# Patient Record
Sex: MALE | Race: WHITE | Employment: FULL TIME | URBAN - METROPOLITAN AREA
[De-identification: names, ages, dates, MRNs, and addresses within clinical notes are randomized per-mention and may not be internally consistent; named-entity substitution may affect disease eponyms.]

---

## 2017-02-15 PROBLEM — M77.12 LATERAL EPICONDYLITIS, LEFT ELBOW: Status: ACTIVE | Noted: 2017-02-15

## 2017-04-11 ENCOUNTER — TELEPHONE (OUTPATIENT)
Dept: FAMILY MEDICINE CLINIC | Facility: CLINIC | Age: 42
End: 2017-04-11

## 2017-04-11 NOTE — TELEPHONE ENCOUNTER
Received outside lab results. Called and informed pt (per dr gaviria) that results were good, cholesterol was elevated but to focus on a low lipid diet. Pt expressed understanding and had no further questions.

## 2017-08-11 ENCOUNTER — LABORATORY ENCOUNTER (OUTPATIENT)
Dept: LAB | Age: 42
End: 2017-08-11
Attending: FAMILY MEDICINE
Payer: COMMERCIAL

## 2017-08-11 ENCOUNTER — OFFICE VISIT (OUTPATIENT)
Dept: FAMILY MEDICINE CLINIC | Facility: CLINIC | Age: 42
End: 2017-08-11

## 2017-08-11 VITALS
WEIGHT: 208 LBS | RESPIRATION RATE: 20 BRPM | HEART RATE: 72 BPM | HEIGHT: 67.5 IN | DIASTOLIC BLOOD PRESSURE: 80 MMHG | OXYGEN SATURATION: 99 % | SYSTOLIC BLOOD PRESSURE: 120 MMHG | BODY MASS INDEX: 32.27 KG/M2 | TEMPERATURE: 99 F

## 2017-08-11 DIAGNOSIS — Z13.0 SCREENING FOR ENDOCRINE, NUTRITIONAL, METABOLIC AND IMMUNITY DISORDER: ICD-10-CM

## 2017-08-11 DIAGNOSIS — Z13.228 SCREENING FOR ENDOCRINE, NUTRITIONAL, METABOLIC AND IMMUNITY DISORDER: ICD-10-CM

## 2017-08-11 DIAGNOSIS — Z00.00 ROUTINE GENERAL MEDICAL EXAMINATION AT A HEALTH CARE FACILITY: Primary | ICD-10-CM

## 2017-08-11 DIAGNOSIS — Z13.29 SCREENING FOR ENDOCRINE, NUTRITIONAL, METABOLIC AND IMMUNITY DISORDER: ICD-10-CM

## 2017-08-11 DIAGNOSIS — Z13.21 SCREENING FOR ENDOCRINE, NUTRITIONAL, METABOLIC AND IMMUNITY DISORDER: ICD-10-CM

## 2017-08-11 LAB
25-HYDROXYVITAMIN D (TOTAL): 24.7 NG/ML (ref 30–100)
ALBUMIN SERPL-MCNC: 4 G/DL (ref 3.5–4.8)
ALP LIVER SERPL-CCNC: 68 U/L (ref 45–117)
ALT SERPL-CCNC: 38 U/L (ref 17–63)
AST SERPL-CCNC: 14 U/L (ref 15–41)
BASOPHILS # BLD AUTO: 0.05 X10(3) UL (ref 0–0.1)
BASOPHILS NFR BLD AUTO: 0.8 %
BILIRUB SERPL-MCNC: 0.6 MG/DL (ref 0.1–2)
BUN BLD-MCNC: 15 MG/DL (ref 8–20)
CALCIUM BLD-MCNC: 8.9 MG/DL (ref 8.3–10.3)
CHLORIDE: 107 MMOL/L (ref 101–111)
CHOLEST SMN-MCNC: 213 MG/DL (ref ?–200)
CO2: 28 MMOL/L (ref 22–32)
CREAT BLD-MCNC: 0.87 MG/DL (ref 0.7–1.3)
EOSINOPHIL # BLD AUTO: 0.07 X10(3) UL (ref 0–0.3)
EOSINOPHIL NFR BLD AUTO: 1.2 %
ERYTHROCYTE [DISTWIDTH] IN BLOOD BY AUTOMATED COUNT: 13.1 % (ref 11.5–16)
GLUCOSE BLD-MCNC: 97 MG/DL (ref 70–99)
HCT VFR BLD AUTO: 47.2 % (ref 37–53)
HDLC SERPL-MCNC: 52 MG/DL (ref 45–?)
HDLC SERPL: 4.1 {RATIO} (ref ?–4.97)
HGB BLD-MCNC: 15.7 G/DL (ref 13–17)
IMMATURE GRANULOCYTE COUNT: 0.07 X10(3) UL (ref 0–1)
IMMATURE GRANULOCYTE RATIO %: 1.2 %
LDLC SERPL CALC-MCNC: 126 MG/DL (ref ?–130)
LDLC SERPL-MCNC: 35 MG/DL (ref 5–40)
LYMPHOCYTES # BLD AUTO: 1.26 X10(3) UL (ref 0.9–4)
LYMPHOCYTES NFR BLD AUTO: 20.8 %
M PROTEIN MFR SERPL ELPH: 7.1 G/DL (ref 6.1–8.3)
MCH RBC QN AUTO: 30.3 PG (ref 27–33.2)
MCHC RBC AUTO-ENTMCNC: 33.3 G/DL (ref 31–37)
MCV RBC AUTO: 90.9 FL (ref 80–99)
MONOCYTES # BLD AUTO: 0.48 X10(3) UL (ref 0.1–0.6)
MONOCYTES NFR BLD AUTO: 7.9 %
NEUTROPHIL ABS PRELIM: 4.13 X10 (3) UL (ref 1.3–6.7)
NEUTROPHILS # BLD AUTO: 4.13 X10(3) UL (ref 1.3–6.7)
NEUTROPHILS NFR BLD AUTO: 68.1 %
NONHDLC SERPL-MCNC: 161 MG/DL (ref ?–130)
PLATELET # BLD AUTO: 252 10(3)UL (ref 150–450)
POTASSIUM SERPL-SCNC: 4 MMOL/L (ref 3.6–5.1)
RBC # BLD AUTO: 5.19 X10(6)UL (ref 4.3–5.7)
RED CELL DISTRIBUTION WIDTH-SD: 43.6 FL (ref 35.1–46.3)
SODIUM SERPL-SCNC: 142 MMOL/L (ref 136–144)
TRIGLYCERIDES: 177 MG/DL (ref ?–150)
TSI SER-ACNC: 2.8 MIU/ML (ref 0.35–5.5)
WBC # BLD AUTO: 6.1 X10(3) UL (ref 4–13)

## 2017-08-11 PROCEDURE — 36415 COLL VENOUS BLD VENIPUNCTURE: CPT | Performed by: FAMILY MEDICINE

## 2017-08-11 PROCEDURE — 99396 PREV VISIT EST AGE 40-64: CPT | Performed by: FAMILY MEDICINE

## 2017-08-11 PROCEDURE — 80061 LIPID PANEL: CPT | Performed by: FAMILY MEDICINE

## 2017-08-11 PROCEDURE — 82306 VITAMIN D 25 HYDROXY: CPT | Performed by: FAMILY MEDICINE

## 2017-08-11 PROCEDURE — 80050 GENERAL HEALTH PANEL: CPT | Performed by: FAMILY MEDICINE

## 2017-08-11 RX ORDER — PHENTERMINE HYDROCHLORIDE 37.5 MG/1
37.5 TABLET ORAL
Qty: 30 TABLET | Refills: 1 | Status: SHIPPED
Start: 2017-08-11 | End: 2017-10-10

## 2017-08-11 NOTE — PATIENT INSTRUCTIONS
4 Steps for Eating Healthier  Changing the way you eat can improve your health. It can lower your cholesterol and blood pressure, and help you stay at a healthy weight. Your diet doesn’t have to be bland and boring to be healthy.  Just watch your calories · Drink more water to match your fiber increase. This is to help prevent constipation. Date Last Reviewed: 5/11/2015  © 1373-5771 The [facility], [address] [place] [address]. All rights reserved.  This information is not intended a

## 2017-08-11 NOTE — PROGRESS NOTES
Thu Souza is a 40year old male who presents for a complete physical exam.   HPI:      Patient presents with:  Physical      Patient is present for complete physical. Feels very well. Up to date with dental visits. No hearing problems.  Vaccinations: Location: [facility]   Family History   Problem Relation Age of Onset   • Heart Disorder Father    • Cancer Other      family hx   • CAD [OTHER] Maternal Grandfather    • CAD [OTHER] Paternal Grandfather    • Diabetes Maternal murmurs. Lungs: are clear to auscultation bilaterally, with no wheeze, rhonchi, or rales. Heart: is RRR. S1, S2, with no murmurs. Abdomen: is soft, NT/ND with no HSM. No rebound or guarding, NABS.      exam: testes bilaterally descended without m

## 2017-08-15 ENCOUNTER — TELEPHONE (OUTPATIENT)
Dept: FAMILY MEDICINE CLINIC | Facility: CLINIC | Age: 42
End: 2017-08-15

## 2017-08-15 NOTE — TELEPHONE ENCOUNTER
Informed pt of labs and instructions listed below. Pt expressed understanding with no further questions.

## 2017-08-15 NOTE — TELEPHONE ENCOUNTER
----- Message from Rohit Jansen MD sent at 8/12/2017 11:33 AM CDT -----  Overall very good, cont. Vit.  D 1000U a day

## 2017-09-11 ENCOUNTER — OFFICE VISIT (OUTPATIENT)
Dept: FAMILY MEDICINE CLINIC | Facility: CLINIC | Age: 42
End: 2017-09-11

## 2017-09-11 VITALS
TEMPERATURE: 98 F | OXYGEN SATURATION: 99 % | HEART RATE: 70 BPM | WEIGHT: 205 LBS | SYSTOLIC BLOOD PRESSURE: 120 MMHG | HEIGHT: 69.5 IN | RESPIRATION RATE: 20 BRPM | DIASTOLIC BLOOD PRESSURE: 78 MMHG | BODY MASS INDEX: 29.68 KG/M2

## 2017-09-11 DIAGNOSIS — E66.9 OBESITY (BMI 30.0-34.9): ICD-10-CM

## 2017-09-11 DIAGNOSIS — Z51.81 ENCOUNTER FOR THERAPEUTIC DRUG MONITORING: Primary | ICD-10-CM

## 2017-09-11 PROCEDURE — 99213 OFFICE O/P EST LOW 20 MIN: CPT | Performed by: FAMILY MEDICINE

## 2017-09-11 RX ORDER — PHENTERMINE HYDROCHLORIDE 37.5 MG/1
37.5 TABLET ORAL
Qty: 30 TABLET | Refills: 1 | Status: SHIPPED
Start: 2017-09-11 | End: 2017-11-10

## 2017-09-11 NOTE — PROGRESS NOTES
CC: Patient presents with:  Weight Check         HPI:  Pt lost 3  lbs. Pt exercises 3 times a week, watches carefully healthy diet. No chest pains, SOB, no palpitations. Tolerates Phentermine very well.           Current Outpatient Prescriptions:  Ph of these issues and agrees to the plan. All side effects of Phentermine d/w the pt, heart problems possible  in the future, not known effect were explained. Exercise at least 3 times per week, 30min and low calories diet d/w the pt. 15 minutes counseling.   P

## 2017-09-28 ENCOUNTER — TELEPHONE (OUTPATIENT)
Dept: FAMILY MEDICINE CLINIC | Facility: CLINIC | Age: 42
End: 2017-09-28

## 2017-09-28 NOTE — TELEPHONE ENCOUNTER
I called and spoke to patient to find out if he knows when he had the first 2 doses of the series completed. He states he had this done through his employer and believes the first one was given sometime in March 2017 and the last one given shortly after.  H

## 2017-09-28 NOTE — TELEPHONE ENCOUNTER
pt calling to notify office (as directed) that he had 3rd hep b vaccine on 9-26-17, states that he will have flu vaccine administered at workplace

## 2017-11-13 ENCOUNTER — OFFICE VISIT (OUTPATIENT)
Dept: FAMILY MEDICINE CLINIC | Facility: CLINIC | Age: 42
End: 2017-11-13

## 2017-11-13 VITALS
SYSTOLIC BLOOD PRESSURE: 120 MMHG | DIASTOLIC BLOOD PRESSURE: 70 MMHG | BODY MASS INDEX: 32.02 KG/M2 | HEIGHT: 67 IN | OXYGEN SATURATION: 99 % | WEIGHT: 204 LBS | HEART RATE: 68 BPM | TEMPERATURE: 98 F | RESPIRATION RATE: 20 BRPM

## 2017-11-13 DIAGNOSIS — Z51.81 ENCOUNTER FOR THERAPEUTIC DRUG MONITORING: ICD-10-CM

## 2017-11-13 DIAGNOSIS — E66.9 OBESITY (BMI 30.0-34.9): Primary | ICD-10-CM

## 2017-11-13 PROCEDURE — 99214 OFFICE O/P EST MOD 30 MIN: CPT | Performed by: FAMILY MEDICINE

## 2017-11-13 NOTE — PROGRESS NOTES
CC: Patient presents with:  Weight Check       HPI:  Pt presents for obesity problem. Pt has it for few years, playing hockey makes it better. Risk factors: sedentary life style. Makes better: low calories diet and physical activities.   Makes wors defined types were placed in this encounter. ASSESSMENT AND PLAN:     Obesity - Discussed BMI target. Discussed calorie counting, diet, exercise, and one pound of weight loss per week by decreasing calorie intake by 500 calories per day. Handout given

## 2017-12-20 ENCOUNTER — TELEPHONE (OUTPATIENT)
Dept: FAMILY MEDICINE CLINIC | Facility: CLINIC | Age: 42
End: 2017-12-20

## 2017-12-20 NOTE — TELEPHONE ENCOUNTER
Patient called and needs the dates of all of the times his family and him have been seen in the clinic for the past 7 years. He needs it for [facility]. Kids are Fredo Garcia 6/10/2010, Americo 6/1/09, Bibianajoellen Albaman 6/1/09 and his wife Juarez 10/3/1971.   Please advise lenora

## 2017-12-21 NOTE — TELEPHONE ENCOUNTER
Pt completed SHAYLA, and clarified that he only needed list of dates seen. Explained to Pt that in the office, we only have access to records from 2012 to present and that all other records must be obtained from main hospital, Pt confirmed understanding.  Prin

## 2017-12-21 NOTE — TELEPHONE ENCOUNTER
Patient is coming to the office and filing out an SHAYLA for the request. The patient is actually needing office note from each visit for the last 7 years for each person in the family.     Told the patient this request needs to be sent to [facility]

## 2018-01-15 ENCOUNTER — OFFICE VISIT (OUTPATIENT)
Dept: FAMILY MEDICINE CLINIC | Facility: CLINIC | Age: 43
End: 2018-01-15

## 2018-01-15 VITALS
SYSTOLIC BLOOD PRESSURE: 112 MMHG | HEART RATE: 70 BPM | DIASTOLIC BLOOD PRESSURE: 70 MMHG | BODY MASS INDEX: 32.49 KG/M2 | HEIGHT: 67 IN | WEIGHT: 207 LBS | RESPIRATION RATE: 20 BRPM | TEMPERATURE: 99 F | OXYGEN SATURATION: 99 %

## 2018-01-15 DIAGNOSIS — E66.9 OBESITY (BMI 30.0-34.9): ICD-10-CM

## 2018-01-15 DIAGNOSIS — Z51.81 ENCOUNTER FOR THERAPEUTIC DRUG MONITORING: Primary | ICD-10-CM

## 2018-01-15 PROCEDURE — 99214 OFFICE O/P EST MOD 30 MIN: CPT | Performed by: FAMILY MEDICINE

## 2018-01-15 RX ORDER — PHENTERMINE HYDROCHLORIDE 37.5 MG/1
37.5 TABLET ORAL
COMMUNITY
End: 2018-06-15 | Stop reason: ALTCHOICE

## 2018-01-15 NOTE — PROGRESS NOTES
CC: Patient presents with:  Weight Check       HPI:  Pt presents for obesity problem. Pt has it for:last few years. Risk factors: sedentary life style. Makes better: low calories diet and physical activities.   Makes worse: sugar craving and no exe abdomen, no organomegaly. PSYCH: normal mood, affect. No orders of the defined types were placed in this encounter. ASSESSMENT AND PLAN:     Obesity - Discussed BMI target.  Discussed calorie counting, diet, exercise, and one pound of weight loss

## 2018-03-08 ENCOUNTER — MED REC SCAN ONLY (OUTPATIENT)
Dept: FAMILY MEDICINE CLINIC | Facility: CLINIC | Age: 43
End: 2018-03-08

## 2018-03-08 ENCOUNTER — TELEPHONE (OUTPATIENT)
Dept: FAMILY MEDICINE CLINIC | Facility: CLINIC | Age: 43
End: 2018-03-08

## 2018-03-08 DIAGNOSIS — E78.00 ELEVATED CHOLESTEROL: Primary | ICD-10-CM

## 2018-03-08 NOTE — TELEPHONE ENCOUNTER
Paper lab results received from Dr. Kayode Kirby. Pt needs to follow low lipid diet, repeat lipid & CMP in 2 months. Labs placed in EPIC. Pt notified of this. Pt states he has appt on Monday with Dr. Kayode Kirby & will talk to her then.   Lab sent to scan,

## 2018-03-12 ENCOUNTER — OFFICE VISIT (OUTPATIENT)
Dept: FAMILY MEDICINE CLINIC | Facility: CLINIC | Age: 43
End: 2018-03-12

## 2018-03-12 VITALS
RESPIRATION RATE: 20 BRPM | HEART RATE: 82 BPM | WEIGHT: 207 LBS | TEMPERATURE: 99 F | BODY MASS INDEX: 32.49 KG/M2 | OXYGEN SATURATION: 99 % | HEIGHT: 67 IN | SYSTOLIC BLOOD PRESSURE: 124 MMHG | DIASTOLIC BLOOD PRESSURE: 82 MMHG

## 2018-03-12 DIAGNOSIS — E66.9 OBESITY (BMI 30.0-34.9): ICD-10-CM

## 2018-03-12 DIAGNOSIS — E78.00 PURE HYPERCHOLESTEROLEMIA: Primary | ICD-10-CM

## 2018-03-12 PROCEDURE — 99214 OFFICE O/P EST MOD 30 MIN: CPT | Performed by: FAMILY MEDICINE

## 2018-03-12 NOTE — PATIENT INSTRUCTIONS
Facts About Dietary Fat     Olive oil is a good source of unsaturated fat. Eating less saturated and trans fat is one of the best things you can do for your heart. Start by finding out which fats are better to use.  Then always try to use as little \" Date Last Reviewed: 6/1/2017  © 9327-6101 The [employer] [address]. [facility], [address] [place]. All rights reserved. This information is not intended as a substitute for professional medical care.  Always follow your healthcare professional' · Cut the sugar in recipes by 1/3 to 1/2. Boost the flavor with extracts like almond, vanilla, or orange. Or add spices such as cinnamon or nutmeg. Step 4. Eat more fiber  · Eat fresh fruits and vegetables every day. · Boost your diet with whole grains.

## 2018-03-12 NOTE — PROGRESS NOTES
CC: CATIE       Ga Baer is a 44year old male who presents for  Hyperlipidemia, obesity. Currently asymptomatic, no chest pains, jaw pains, arm pains. No skin lesions. No SOB on exertion or rest.  Current treatment: no medications.   Pt does not sm HERNIORRAPHY;               Surgeon: Kandis Carrillo MD;  Location: [address] [place] [address]   Social History:   Smoking status: Never Smoker                                                              Smokeless tobacco: Never Used

## 2018-04-09 ENCOUNTER — OFFICE VISIT (OUTPATIENT)
Dept: FAMILY MEDICINE CLINIC | Facility: CLINIC | Age: 43
End: 2018-04-09

## 2018-04-09 VITALS
WEIGHT: 205 LBS | DIASTOLIC BLOOD PRESSURE: 68 MMHG | OXYGEN SATURATION: 99 % | HEART RATE: 72 BPM | TEMPERATURE: 99 F | BODY MASS INDEX: 32.18 KG/M2 | HEIGHT: 67 IN | SYSTOLIC BLOOD PRESSURE: 120 MMHG | RESPIRATION RATE: 20 BRPM

## 2018-04-09 DIAGNOSIS — E78.00 PURE HYPERCHOLESTEROLEMIA: Primary | ICD-10-CM

## 2018-04-09 PROCEDURE — 99214 OFFICE O/P EST MOD 30 MIN: CPT | Performed by: FAMILY MEDICINE

## 2018-04-09 NOTE — PROGRESS NOTES
CC: CATIE       Gracie Edwards is a 44year old male who presents for  hyperlipidemia. Currently asymptomatic, no chest pains, jaw pains, arm pains. No skin lesions. No SOB on exertion or rest.  Current treatment: no medications. Pt does not smoke. Surgeon: Chey Napier MD;  Location: [facility]   Social History:   Smoking status: Never Smoker                                                              Smokeless tobacco: Never Used                      Alcohol

## 2018-06-14 ENCOUNTER — APPOINTMENT (OUTPATIENT)
Dept: LAB | Age: 43
End: 2018-06-14
Attending: FAMILY MEDICINE
Payer: COMMERCIAL

## 2018-06-14 DIAGNOSIS — E78.00 ELEVATED CHOLESTEROL: ICD-10-CM

## 2018-06-14 DIAGNOSIS — E66.9 OBESITY (BMI 30.0-34.9): ICD-10-CM

## 2018-06-14 DIAGNOSIS — E78.00 PURE HYPERCHOLESTEROLEMIA: ICD-10-CM

## 2018-06-14 PROCEDURE — 80061 LIPID PANEL: CPT | Performed by: FAMILY MEDICINE

## 2018-06-14 PROCEDURE — 36415 COLL VENOUS BLD VENIPUNCTURE: CPT | Performed by: FAMILY MEDICINE

## 2018-06-14 PROCEDURE — 80053 COMPREHEN METABOLIC PANEL: CPT | Performed by: FAMILY MEDICINE

## 2018-06-14 PROCEDURE — 84100 ASSAY OF PHOSPHORUS: CPT | Performed by: FAMILY MEDICINE

## 2018-06-15 ENCOUNTER — OFFICE VISIT (OUTPATIENT)
Dept: FAMILY MEDICINE CLINIC | Facility: CLINIC | Age: 43
End: 2018-06-15

## 2018-06-15 VITALS
HEART RATE: 70 BPM | WEIGHT: 207 LBS | DIASTOLIC BLOOD PRESSURE: 84 MMHG | RESPIRATION RATE: 16 BRPM | HEIGHT: 67 IN | TEMPERATURE: 98 F | BODY MASS INDEX: 32.49 KG/M2 | OXYGEN SATURATION: 98 % | SYSTOLIC BLOOD PRESSURE: 120 MMHG

## 2018-06-15 DIAGNOSIS — E66.9 OBESITY (BMI 30.0-34.9): Primary | ICD-10-CM

## 2018-06-15 PROCEDURE — 99214 OFFICE O/P EST MOD 30 MIN: CPT | Performed by: FAMILY MEDICINE

## 2018-06-15 NOTE — PROGRESS NOTES
CC: Patient presents with:  Lab Results: f/u       HPI:  Pt presents for obesity problem. Pt has problems with eating a lot one time of the day. No regular meals. Pt has it for few years, playing hockey makes it better.   Risk factors: sedentary life s abdomen, no organomegaly. PSYCH: normal mood, affect. No orders of the defined types were placed in this encounter. ASSESSMENT AND PLAN:     Obesity - Discussed BMI target.  Discussed calorie counting, diet, exercise, and one pound of weight loss

## 2018-06-20 ENCOUNTER — TELEPHONE (OUTPATIENT)
Dept: FAMILY MEDICINE CLINIC | Facility: CLINIC | Age: 43
End: 2018-06-20

## 2018-06-20 DIAGNOSIS — E83.39 SERUM PHOSPHATE ELEVATED: Primary | ICD-10-CM

## 2018-06-20 NOTE — TELEPHONE ENCOUNTER
I spoke with Dr. Griselda Edmond and he suggested to do additional blood work for Phosphorus for the pt. Please let hi do it, fasting and in the morning.

## 2018-06-21 NOTE — TELEPHONE ENCOUNTER
Patient aware of additional fasting lab tests. Patient says he will come in to the lab tomorrow morning 6/21.

## 2018-06-22 ENCOUNTER — APPOINTMENT (OUTPATIENT)
Dept: LAB | Age: 43
End: 2018-06-22
Attending: FAMILY MEDICINE
Payer: COMMERCIAL

## 2018-06-22 DIAGNOSIS — E83.39 SERUM PHOSPHATE ELEVATED: ICD-10-CM

## 2018-06-22 PROCEDURE — 84100 ASSAY OF PHOSPHORUS: CPT | Performed by: FAMILY MEDICINE

## 2018-06-22 PROCEDURE — 82570 ASSAY OF URINE CREATININE: CPT | Performed by: FAMILY MEDICINE

## 2018-06-22 PROCEDURE — 82310 ASSAY OF CALCIUM: CPT | Performed by: FAMILY MEDICINE

## 2018-06-22 PROCEDURE — 84105 ASSAY OF URINE PHOSPHORUS: CPT | Performed by: FAMILY MEDICINE

## 2018-06-22 PROCEDURE — 82565 ASSAY OF CREATININE: CPT | Performed by: FAMILY MEDICINE

## 2018-06-22 PROCEDURE — 83970 ASSAY OF PARATHORMONE: CPT | Performed by: FAMILY MEDICINE

## 2018-06-22 PROCEDURE — 36415 COLL VENOUS BLD VENIPUNCTURE: CPT | Performed by: FAMILY MEDICINE

## 2018-06-25 ENCOUNTER — TELEPHONE (OUTPATIENT)
Dept: FAMILY MEDICINE CLINIC | Facility: CLINIC | Age: 43
End: 2018-06-25

## 2018-06-25 NOTE — TELEPHONE ENCOUNTER
Spoke with patient and informed him of Dr. Jonathan Wadsworth contact information for low phos. Will call to schedule.

## 2018-06-25 NOTE — TELEPHONE ENCOUNTER
----- Message from Serg Terrell MD sent at 6/25/2018  9:50 AM CDT -----  Pt has unusual low Phosphorus , pt will see Dr. Deng Merida for second opinion.

## 2018-07-31 ENCOUNTER — OFFICE VISIT (OUTPATIENT)
Dept: NEPHROLOGY | Facility: CLINIC | Age: 43
End: 2018-07-31
Payer: COMMERCIAL

## 2018-07-31 VITALS — BODY MASS INDEX: 33 KG/M2 | WEIGHT: 210 LBS | SYSTOLIC BLOOD PRESSURE: 122 MMHG | DIASTOLIC BLOOD PRESSURE: 74 MMHG

## 2018-07-31 DIAGNOSIS — E83.39 HYPOPHOSPHATEMIA: Primary | ICD-10-CM

## 2018-07-31 PROCEDURE — 99244 OFF/OP CNSLTJ NEW/EST MOD 40: CPT | Performed by: INTERNAL MEDICINE

## 2018-07-31 NOTE — PROGRESS NOTES
Nephrology Consult Note    REASON FOR CONSULT: Hypophosphatemia    ASSESSMENT/PLAN:        1) Hypophosphatemia- discovered incidentally recently on a wellness exam with a serum phosphorus of less than 2 mg/dL on 3 separate occasions is likely due to mild p N/V/D  Denies change in urinary habits or gross hematuria  Denies LE edema  Denies skin rashes/myalgias/arthralgias    PMH:  Past Medical History:   Diagnosis Date   • Allergic rhinitis    • Erectile dysfunction    • TMJ (dislocation of temporomandibular j rate and rhythm, S1, S2 normal, no murmur or rub  Lungs: Clear without wheezes, rales, rhonchi. Abdomen: Soft, non-tender. + bowel sounds, no palpable organomegaly  Extremities: Without clubbing, cyanosis or edema.   Neurologic:  normal affect, cranial n

## 2018-12-04 ENCOUNTER — NURSE ONLY (OUTPATIENT)
Dept: FAMILY MEDICINE CLINIC | Facility: CLINIC | Age: 43
End: 2018-12-04
Payer: COMMERCIAL

## 2018-12-04 VITALS
TEMPERATURE: 98 F | WEIGHT: 205 LBS | OXYGEN SATURATION: 98 % | DIASTOLIC BLOOD PRESSURE: 82 MMHG | HEIGHT: 67 IN | SYSTOLIC BLOOD PRESSURE: 118 MMHG | HEART RATE: 70 BPM | RESPIRATION RATE: 18 BRPM | BODY MASS INDEX: 32.18 KG/M2

## 2018-12-04 DIAGNOSIS — J06.9 VIRAL URI: Primary | ICD-10-CM

## 2018-12-04 PROCEDURE — 99213 OFFICE O/P EST LOW 20 MIN: CPT | Performed by: NURSE PRACTITIONER

## 2018-12-04 RX ORDER — FLUTICASONE PROPIONATE 50 MCG
1 SPRAY, SUSPENSION (ML) NASAL 2 TIMES DAILY
Qty: 1 BOTTLE | Refills: 1 | Status: SHIPPED | OUTPATIENT
Start: 2018-12-04 | End: 2019-01-25

## 2018-12-04 NOTE — PATIENT INSTRUCTIONS
· Common colds are caused by viruses which are not eradicated with antibiotics. · Cold remedies are to relieve symptoms and prevent complications rather than cure infection  · Rest and increased oral fluid intake is advised.   · Increase humidity of the ai

## 2018-12-04 NOTE — PROGRESS NOTES
HPI:   Salud Veras is a 44year old male who presents with ill symptoms for  5  days. Patient reports congestion, queasiness from post nasal drip, sneezing frequently, fatigued, itchy ears.  Has tried Nyquil and Dayquil with mild intermittent relief, w edema, clear PND noted. Uvuvla midline. No sinus tenderness with palpation.   NECK: supple,no adenopathy  LUNGS: clear to auscultation all lobes  CARDIO: RRR without murmur  GI: good BS's,no masses, HSM or tenderness    ASSESSMENT AND PLAN:    PLAN:Jayro soares

## 2019-01-25 DIAGNOSIS — J06.9 VIRAL URI: ICD-10-CM

## 2019-01-25 RX ORDER — FLUTICASONE PROPIONATE 50 MCG
1 SPRAY, SUSPENSION (ML) NASAL 2 TIMES DAILY
Qty: 1 BOTTLE | Refills: 1 | Status: SHIPPED | OUTPATIENT
Start: 2019-01-25 | End: 2019-07-26

## 2019-01-25 NOTE — TELEPHONE ENCOUNTER
Patient would like a refill of the following medication to be sent to [facility]:  Fluticasone Propionate 50 MCG/ACT Nasal Suspension 1 Bottle 1 12/4/2018 1/3/2019    Sig - Route: 1 spray by Each Nare route 2 (two) times daily. - Each Nare    Sent to [facility]

## 2019-07-26 ENCOUNTER — OFFICE VISIT (OUTPATIENT)
Dept: FAMILY MEDICINE CLINIC | Facility: CLINIC | Age: 44
End: 2019-07-26
Payer: COMMERCIAL

## 2019-07-26 ENCOUNTER — LAB ENCOUNTER (OUTPATIENT)
Dept: LAB | Age: 44
End: 2019-07-26
Attending: FAMILY MEDICINE
Payer: COMMERCIAL

## 2019-07-26 VITALS
HEART RATE: 72 BPM | WEIGHT: 199 LBS | HEIGHT: 67 IN | DIASTOLIC BLOOD PRESSURE: 82 MMHG | RESPIRATION RATE: 20 BRPM | OXYGEN SATURATION: 99 % | TEMPERATURE: 99 F | SYSTOLIC BLOOD PRESSURE: 118 MMHG | BODY MASS INDEX: 31.23 KG/M2

## 2019-07-26 DIAGNOSIS — Z13.29 SCREENING FOR ENDOCRINE, NUTRITIONAL, METABOLIC AND IMMUNITY DISORDER: ICD-10-CM

## 2019-07-26 DIAGNOSIS — Z13.21 SCREENING FOR ENDOCRINE, NUTRITIONAL, METABOLIC AND IMMUNITY DISORDER: ICD-10-CM

## 2019-07-26 DIAGNOSIS — Z13.0 SCREENING FOR ENDOCRINE, NUTRITIONAL, METABOLIC AND IMMUNITY DISORDER: ICD-10-CM

## 2019-07-26 DIAGNOSIS — Z00.00 ROUTINE GENERAL MEDICAL EXAMINATION AT A HEALTH CARE FACILITY: Primary | ICD-10-CM

## 2019-07-26 DIAGNOSIS — Z13.228 SCREENING FOR ENDOCRINE, NUTRITIONAL, METABOLIC AND IMMUNITY DISORDER: ICD-10-CM

## 2019-07-26 DIAGNOSIS — Z23 NEED FOR TDAP VACCINATION: ICD-10-CM

## 2019-07-26 LAB
ALBUMIN SERPL-MCNC: 4.2 G/DL (ref 3.4–5)
ALBUMIN/GLOB SERPL: 1.3 {RATIO} (ref 1–2)
ALP LIVER SERPL-CCNC: 76 U/L (ref 45–117)
ALT SERPL-CCNC: 34 U/L (ref 16–61)
ANION GAP SERPL CALC-SCNC: 6 MMOL/L (ref 0–18)
AST SERPL-CCNC: 15 U/L (ref 15–37)
BASOPHILS # BLD AUTO: 0.06 X10(3) UL (ref 0–0.2)
BASOPHILS NFR BLD AUTO: 0.8 %
BILIRUB SERPL-MCNC: 0.8 MG/DL (ref 0.1–2)
BUN BLD-MCNC: 10 MG/DL (ref 7–18)
BUN/CREAT SERPL: 10.5 (ref 10–20)
CALCIUM BLD-MCNC: 9.2 MG/DL (ref 8.5–10.1)
CHLORIDE SERPL-SCNC: 107 MMOL/L (ref 98–112)
CHOLEST SMN-MCNC: 224 MG/DL (ref ?–200)
CO2 SERPL-SCNC: 27 MMOL/L (ref 21–32)
CREAT BLD-MCNC: 0.95 MG/DL (ref 0.7–1.3)
DEPRECATED RDW RBC AUTO: 43.8 FL (ref 35.1–46.3)
EOSINOPHIL # BLD AUTO: 0.07 X10(3) UL (ref 0–0.7)
EOSINOPHIL NFR BLD AUTO: 1 %
ERYTHROCYTE [DISTWIDTH] IN BLOOD BY AUTOMATED COUNT: 13.1 % (ref 11–15)
GLOBULIN PLAS-MCNC: 3.3 G/DL (ref 2.8–4.4)
GLUCOSE BLD-MCNC: 87 MG/DL (ref 70–99)
HCT VFR BLD AUTO: 46.9 % (ref 39–53)
HDLC SERPL-MCNC: 56 MG/DL (ref 40–59)
HGB BLD-MCNC: 15.6 G/DL (ref 13–17.5)
IMM GRANULOCYTES # BLD AUTO: 0.07 X10(3) UL (ref 0–1)
IMM GRANULOCYTES NFR BLD: 1 %
LDLC SERPL CALC-MCNC: 146 MG/DL (ref ?–100)
LYMPHOCYTES # BLD AUTO: 1.93 X10(3) UL (ref 1–4)
LYMPHOCYTES NFR BLD AUTO: 27.1 %
M PROTEIN MFR SERPL ELPH: 7.5 G/DL (ref 6.4–8.2)
MCH RBC QN AUTO: 30.4 PG (ref 26–34)
MCHC RBC AUTO-ENTMCNC: 33.3 G/DL (ref 31–37)
MCV RBC AUTO: 91.2 FL (ref 80–100)
MONOCYTES # BLD AUTO: 0.7 X10(3) UL (ref 0.1–1)
MONOCYTES NFR BLD AUTO: 9.8 %
NEUTROPHILS # BLD AUTO: 4.28 X10 (3) UL (ref 1.5–7.7)
NEUTROPHILS # BLD AUTO: 4.28 X10(3) UL (ref 1.5–7.7)
NEUTROPHILS NFR BLD AUTO: 60.3 %
NONHDLC SERPL-MCNC: 168 MG/DL (ref ?–130)
OSMOLALITY SERPL CALC.SUM OF ELEC: 288 MOSM/KG (ref 275–295)
PLATELET # BLD AUTO: 263 10(3)UL (ref 150–450)
POTASSIUM SERPL-SCNC: 3.9 MMOL/L (ref 3.5–5.1)
RBC # BLD AUTO: 5.14 X10(6)UL (ref 4.3–5.7)
SODIUM SERPL-SCNC: 140 MMOL/L (ref 136–145)
TRIGL SERPL-MCNC: 111 MG/DL (ref 30–149)
TSI SER-ACNC: 3.19 MIU/ML (ref 0.36–3.74)
VIT D+METAB SERPL-MCNC: 23.7 NG/ML (ref 30–100)
VLDLC SERPL CALC-MCNC: 22 MG/DL (ref 0–30)
WBC # BLD AUTO: 7.1 X10(3) UL (ref 4–11)

## 2019-07-26 PROCEDURE — 90471 IMMUNIZATION ADMIN: CPT | Performed by: FAMILY MEDICINE

## 2019-07-26 PROCEDURE — 80061 LIPID PANEL: CPT | Performed by: FAMILY MEDICINE

## 2019-07-26 PROCEDURE — 90715 TDAP VACCINE 7 YRS/> IM: CPT | Performed by: FAMILY MEDICINE

## 2019-07-26 PROCEDURE — 82306 VITAMIN D 25 HYDROXY: CPT | Performed by: FAMILY MEDICINE

## 2019-07-26 PROCEDURE — 99396 PREV VISIT EST AGE 40-64: CPT | Performed by: FAMILY MEDICINE

## 2019-07-26 PROCEDURE — 80050 GENERAL HEALTH PANEL: CPT | Performed by: FAMILY MEDICINE

## 2019-07-26 PROCEDURE — 36415 COLL VENOUS BLD VENIPUNCTURE: CPT | Performed by: FAMILY MEDICINE

## 2019-07-26 RX ORDER — FLUTICASONE PROPIONATE 50 MCG
1 SPRAY, SUSPENSION (ML) NASAL 2 TIMES DAILY
Qty: 1 BOTTLE | Refills: 4 | Status: SHIPPED | OUTPATIENT
Start: 2019-07-26 | End: 2019-10-08

## 2019-07-26 NOTE — PATIENT INSTRUCTIONS
Dr. Nyasia Key     [facility]ermat[facility]    [facility] [id number]  [facility]    Phone: [phone]

## 2019-07-26 NOTE — PROGRESS NOTES
Salud Veras is a 38year old male who presents for a complete physical exam.   HPI:      Patient presents with:  Physical      Patient is present for complete physical. Feels very well. Up to date with dental visits. No hearing problems.  Vaccinations: [address]      Family History   Problem Relation Age of Onset   • Heart Disorder Father    • Cancer Other         family hx   • Other (CAD) Maternal Grandfather    • Other (CAD) Paternal Grandfather    • Diabetes Maternal Grandmother       Socia clear to auscultation bilaterally, with no wheeze, rhonchi, or rales. Heart: is RRR. S1, S2, with no murmurs. Abdomen: is soft, NT/ND with no HSM. No rebound or guarding, NABS.  exam: testes bilaterally descended without masses.   No urethral D/C TSH          Standing Status: Future          Standing Expiration Date: 7/25/2020      VITAMIN D, 25-HYDROXY          Standing Status: Future          Standing Expiration Date: 7/25/2020      TdaP (Adacel, Boostrix) (69768) (DX V06.1/Z23)  Vaccinations marissa

## 2019-10-08 ENCOUNTER — TELEPHONE (OUTPATIENT)
Dept: FAMILY MEDICINE CLINIC | Facility: CLINIC | Age: 44
End: 2019-10-08

## 2019-10-08 ENCOUNTER — OFFICE VISIT (OUTPATIENT)
Dept: FAMILY MEDICINE CLINIC | Facility: CLINIC | Age: 44
End: 2019-10-08
Payer: COMMERCIAL

## 2019-10-08 ENCOUNTER — LAB ENCOUNTER (OUTPATIENT)
Dept: LAB | Age: 44
End: 2019-10-08
Attending: NURSE PRACTITIONER
Payer: COMMERCIAL

## 2019-10-08 VITALS
SYSTOLIC BLOOD PRESSURE: 120 MMHG | DIASTOLIC BLOOD PRESSURE: 84 MMHG | WEIGHT: 202 LBS | RESPIRATION RATE: 16 BRPM | HEIGHT: 67 IN | HEART RATE: 64 BPM | OXYGEN SATURATION: 98 % | BODY MASS INDEX: 31.71 KG/M2

## 2019-10-08 DIAGNOSIS — R07.89 OTHER CHEST PAIN: ICD-10-CM

## 2019-10-08 DIAGNOSIS — H65.02 NON-RECURRENT ACUTE SEROUS OTITIS MEDIA OF LEFT EAR: ICD-10-CM

## 2019-10-08 DIAGNOSIS — M25.512 ACUTE PAIN OF LEFT SHOULDER: Primary | ICD-10-CM

## 2019-10-08 PROCEDURE — 36415 COLL VENOUS BLD VENIPUNCTURE: CPT

## 2019-10-08 PROCEDURE — 85025 COMPLETE CBC W/AUTO DIFF WBC: CPT

## 2019-10-08 PROCEDURE — 80053 COMPREHEN METABOLIC PANEL: CPT

## 2019-10-08 PROCEDURE — 85379 FIBRIN DEGRADATION QUANT: CPT

## 2019-10-08 PROCEDURE — 84484 ASSAY OF TROPONIN QUANT: CPT

## 2019-10-08 PROCEDURE — 93000 ELECTROCARDIOGRAM COMPLETE: CPT | Performed by: NURSE PRACTITIONER

## 2019-10-08 PROCEDURE — 99214 OFFICE O/P EST MOD 30 MIN: CPT | Performed by: NURSE PRACTITIONER

## 2019-10-08 RX ORDER — METHYLPREDNISOLONE 4 MG/1
TABLET ORAL
Qty: 1 KIT | Refills: 0 | Status: SHIPPED | OUTPATIENT
Start: 2019-10-08 | End: 2019-11-08 | Stop reason: ALTCHOICE

## 2019-10-08 RX ORDER — CYCLOBENZAPRINE HCL 10 MG
5 TABLET ORAL NIGHTLY
Qty: 30 TABLET | Refills: 0 | Status: SHIPPED | OUTPATIENT
Start: 2019-10-08 | End: 2019-11-08 | Stop reason: ALTCHOICE

## 2019-10-08 RX ORDER — DICLOFENAC SODIUM 75 MG/1
75 TABLET, DELAYED RELEASE ORAL 2 TIMES DAILY
Qty: 60 TABLET | Refills: 0 | Status: SHIPPED | OUTPATIENT
Start: 2019-10-08 | End: 2019-11-07

## 2019-10-08 RX ORDER — DOXYCYCLINE HYCLATE 100 MG/1
100 CAPSULE ORAL 2 TIMES DAILY
Qty: 14 CAPSULE | Refills: 0 | Status: SHIPPED | OUTPATIENT
Start: 2019-10-08 | End: 2019-10-15

## 2019-10-08 NOTE — TELEPHONE ENCOUNTER
Patient called he started having chest pain last night and was checked out by a paramedic since he is a . Patient said he woke up with the chest pain, ear pain to where it is radiating down his left side to his arm.  I called triage spoke with Danna blils

## 2019-10-08 NOTE — TELEPHONE ENCOUNTER
----- Message from [employer] FNCOLLINS-C sent at 10/8/2019 12:09 PM CDT -----  Labs are good

## 2019-10-08 NOTE — PROGRESS NOTES
Patient presents with:  Shoulder Pain: c/o left shoulder pain, trouble sleeping       HPI: L shoulder has been hurting for 2 days . Dull, throbbing  in character. Radiation to the upper L arm and Left chest pain. Rates pain 3 /10. No weakness.   No numbness WD/WN NAD  HEENT:  PEERL, EOM-i. Left ear: erythematous, tender on palpation over mastoid , no drainage. Throat: no erythema  or exudates. LUNGS:CTA clarisse.   HEART:S1/S2 reg., no murmurs, clicks, gallops  Chest: tender on palpation Left sided chest   SKIN:n

## 2019-10-08 NOTE — TELEPHONE ENCOUNTER
I called pt at [phone] and verified 2 patient identifiers: name & . I discussed results and recommendations at length with patient. Pt aware CBC is still pending. Pt has the ST scheduled for Tues. All questions answered.

## 2019-11-06 ENCOUNTER — HOSPITAL ENCOUNTER (OUTPATIENT)
Dept: CV DIAGNOSTICS | Age: 44
Discharge: HOME | End: 2019-11-06
Attending: NURSE PRACTITIONER
Payer: COMMERCIAL

## 2019-11-06 DIAGNOSIS — R07.89 OTHER CHEST PAIN: ICD-10-CM

## 2019-11-06 PROCEDURE — 93018 CV STRESS TEST I&R ONLY: CPT | Performed by: NURSE PRACTITIONER

## 2019-11-06 PROCEDURE — 93350 STRESS TTE ONLY: CPT | Performed by: NURSE PRACTITIONER

## 2019-11-06 PROCEDURE — 93017 CV STRESS TEST TRACING ONLY: CPT | Performed by: NURSE PRACTITIONER

## 2019-11-07 ENCOUNTER — TELEPHONE (OUTPATIENT)
Dept: FAMILY MEDICINE CLINIC | Facility: CLINIC | Age: 44
End: 2019-11-07

## 2019-11-07 NOTE — TELEPHONE ENCOUNTER
Please see below message. I don't see any calls or that Stress Echo results are back yet in Epic. Please advise on results or if you contacted pt. Thank you.

## 2019-11-08 ENCOUNTER — OFFICE VISIT (OUTPATIENT)
Dept: FAMILY MEDICINE CLINIC | Facility: CLINIC | Age: 44
End: 2019-11-08
Payer: COMMERCIAL

## 2019-11-08 VITALS
DIASTOLIC BLOOD PRESSURE: 70 MMHG | WEIGHT: 200 LBS | TEMPERATURE: 98 F | HEIGHT: 67 IN | OXYGEN SATURATION: 99 % | HEART RATE: 68 BPM | RESPIRATION RATE: 20 BRPM | SYSTOLIC BLOOD PRESSURE: 128 MMHG | BODY MASS INDEX: 31.39 KG/M2

## 2019-11-08 DIAGNOSIS — J02.9 SORE THROAT: Primary | ICD-10-CM

## 2019-11-08 DIAGNOSIS — J00 ACUTE NASOPHARYNGITIS: ICD-10-CM

## 2019-11-08 PROCEDURE — 87880 STREP A ASSAY W/OPTIC: CPT | Performed by: NURSE PRACTITIONER

## 2019-11-08 PROCEDURE — 99213 OFFICE O/P EST LOW 20 MIN: CPT | Performed by: NURSE PRACTITIONER

## 2019-11-08 NOTE — PROGRESS NOTES
CHIEF COMPLAINT:   Patient presents with:  Nasal Congestion: Headache, post nasal drip, night sweats, dry cough mainly to clear throat, unable to sleep through the night, X 4  Note: Needs a work note for tonight shift      HPI:   Mikal Boles is a 38 y /70   Pulse 68   Temp 97.8 °F (36.6 °C) (Oral)   Resp 20   Ht 67\"   Wt 200 lb (90.7 kg)   SpO2 99%   BMI 31.32 kg/m²   GENERAL: well developed, well nourished,in no apparent distress  SKIN: no rashes,no suspicious lesions  HEAD: atraumatic, normocep You have a viral upper respiratory illness (URI), which is another term for the common cold. This illness is contagious during the first few days. It is spread through the air by coughing and sneezing.  It may also be spread by direct contact (touching the · Over-the-counter cold medicines will not shorten the length of time you’re sick, but they may be helpful for the following symptoms: cough, sore throat, and nasal and sinus congestion.  If you take prescription medicines, ask your healthcare provider or p

## 2019-12-06 ENCOUNTER — OFFICE VISIT (OUTPATIENT)
Dept: FAMILY MEDICINE CLINIC | Facility: CLINIC | Age: 44
End: 2019-12-06
Payer: COMMERCIAL

## 2019-12-06 VITALS
HEIGHT: 67 IN | SYSTOLIC BLOOD PRESSURE: 140 MMHG | BODY MASS INDEX: 31.39 KG/M2 | OXYGEN SATURATION: 97 % | HEART RATE: 84 BPM | TEMPERATURE: 98 F | DIASTOLIC BLOOD PRESSURE: 89 MMHG | WEIGHT: 200 LBS

## 2019-12-06 DIAGNOSIS — M54.50 ACUTE LOW BACK PAIN WITHOUT SCIATICA, UNSPECIFIED BACK PAIN LATERALITY: Primary | ICD-10-CM

## 2019-12-06 PROCEDURE — 99213 OFFICE O/P EST LOW 20 MIN: CPT | Performed by: NURSE PRACTITIONER

## 2019-12-06 RX ORDER — METHYLPREDNISOLONE 4 MG/1
TABLET ORAL
Qty: 1 PACKAGE | Refills: 0 | Status: SHIPPED | OUTPATIENT
Start: 2019-12-06 | End: 2020-02-28 | Stop reason: ALTCHOICE

## 2019-12-06 RX ORDER — CYCLOBENZAPRINE HCL 5 MG
5 TABLET ORAL 3 TIMES DAILY PRN
Qty: 30 TABLET | Refills: 0 | Status: SHIPPED | OUTPATIENT
Start: 2019-12-06 | End: 2020-02-28 | Stop reason: ALTCHOICE

## 2019-12-06 NOTE — PROGRESS NOTES
CHIEF COMPLAINT:     Patient presents with:  Back Pain: back pain (1 day)       HPI:   Carloz Anderson is a 38year old male who is here for complaints of lower pain. Pain is located at low back. Pain is described as aching, cramping, throbbing.  Severity /89 (BP Location: Right arm)   Pulse 84   Temp 98 °F (36.7 °C) (Oral)   Ht 67\"   Wt 200 lb (90.7 kg)   SpO2 97%   BMI 31.32 kg/m²    GENERAL: well developed, well nourished,in no apparent distress  SKIN: no rashes,no suspicious lesions  NECK: supple · It can be localized to one spot or area, or it can be more generalized. · It can spread or radiate upwards, to the front, or go down your arms or legs (sciatica). · It can cause muscle spasm.   Most of the time, mechanical problems with the muscles or s · At first, do not try to stretch out the sore spots. If there is a strain, it is not like the good soreness you get after exercising without an injury. In this case, stretching may make it worse.   · Don't sit for long periods, as in a long car ride or dur · Be careful if you are given a prescription medicines, narcotics, or medicine for muscle spasms. They can cause drowsiness, affect your coordination, reflexes, and judgement. Do not drive or operate heavy machinery.   Follow-up care  Follow up with your he

## 2020-02-28 ENCOUNTER — OFFICE VISIT (OUTPATIENT)
Dept: FAMILY MEDICINE CLINIC | Facility: CLINIC | Age: 45
End: 2020-02-28
Payer: COMMERCIAL

## 2020-02-28 VITALS
HEART RATE: 76 BPM | WEIGHT: 200 LBS | DIASTOLIC BLOOD PRESSURE: 84 MMHG | OXYGEN SATURATION: 98 % | HEIGHT: 67 IN | RESPIRATION RATE: 16 BRPM | BODY MASS INDEX: 31.39 KG/M2 | TEMPERATURE: 98 F | SYSTOLIC BLOOD PRESSURE: 122 MMHG

## 2020-02-28 DIAGNOSIS — R51.9 ACUTE NONINTRACTABLE HEADACHE, UNSPECIFIED HEADACHE TYPE: Primary | ICD-10-CM

## 2020-02-28 PROCEDURE — 99213 OFFICE O/P EST LOW 20 MIN: CPT | Performed by: PHYSICIAN ASSISTANT

## 2020-02-28 NOTE — PROGRESS NOTES
CHIEF COMPLAINT:     Patient presents with:  Headache: x today, feels like a migrain or like hes hungover, sensitive to light         HPI:     Barrington Solis is a 41year old male presents with complaints of headache that began today when he woke up.   Rambo Watkins palpitations  LUNGS: Denies shortness of breath, cough, or wheezing  GI: Denies abdominal pain, N/V/C/D.   MUSCULOSKELETAL: no arthralgia or swollen joints  LYMPH:  Denies lymphadenopathy  NEURO:  Headaches as described above. Denies lightheadedness.   No

## 2020-02-28 NOTE — PATIENT INSTRUCTIONS
Headache, Unspecified    A number of things can cause headaches. The cause of your headache isn’t clear. But it doesn’t seem to be a sign of any serious illness. Headache affects almost everyone at some time.  It is the most common reason people miss days · If you have a migraine headache, use sunglasses when in the daylight or around bright indoor lighting until your symptoms get better. Bright glaring light can make this type of headache worse.   Follow-up care  Follow up with your healthcare provider, or

## 2020-08-26 ENCOUNTER — TELEPHONE (OUTPATIENT)
Dept: FAMILY MEDICINE CLINIC | Facility: CLINIC | Age: 45
End: 2020-08-26

## 2020-08-26 DIAGNOSIS — B07.0 PLANTAR WART, LEFT FOOT: Primary | ICD-10-CM

## 2020-08-26 NOTE — TELEPHONE ENCOUNTER
Reason for the order/referral:DERMATOLOGY/CHECK UP   PCP: BRIAN   Refer to Provider [facility]   Specialty:DERMATOLOGY   Patient Insurance: Payor: [employer] / Plan: [id number] / Product Type: HMO /   Duration/Summary of Symptoms:ONGOING   Is this

## 2021-05-15 ENCOUNTER — OFFICE VISIT (OUTPATIENT)
Dept: FAMILY MEDICINE CLINIC | Facility: CLINIC | Age: 46
End: 2021-05-15

## 2021-05-15 DIAGNOSIS — M62.830 MUSCLE SPASM OF BACK: ICD-10-CM

## 2021-05-15 DIAGNOSIS — M54.50 ACUTE BILATERAL LOW BACK PAIN WITHOUT SCIATICA: Primary | ICD-10-CM

## 2021-05-15 PROCEDURE — 99213 OFFICE O/P EST LOW 20 MIN: CPT | Performed by: NURSE PRACTITIONER

## 2021-05-15 RX ORDER — NAPROXEN 500 MG/1
500 TABLET ORAL 2 TIMES DAILY WITH MEALS
Qty: 30 TABLET | Refills: 0 | Status: SHIPPED | OUTPATIENT
Start: 2021-05-15 | End: 2021-05-30

## 2021-05-15 RX ORDER — CYCLOBENZAPRINE HCL 10 MG
10 TABLET ORAL 3 TIMES DAILY
Qty: 30 TABLET | Refills: 0 | Status: SHIPPED | OUTPATIENT
Start: 2021-05-15 | End: 2021-06-04

## 2021-05-15 NOTE — PROGRESS NOTES
CHIEF COMPLAINT:     Patient presents with:  Back Pain      HPI:   Brittany Latif is a 40year old male who is here for complaints of back pain. Pain is located at neck to lower bilateral back region. Pain is described as aching, cramping, sharp.  Patricia heartburn  : no dysuria, urgency or flank pain. MUSCULOSKELETAL: Per HPI. No other joints are affected  NEURO: No numbness or tingling. No loss of bowel or bladder control.     EXAM:   O2 sats- 98%, HR-91, BP- 131/93, resp-20  GENERAL: well developed,

## 2022-09-14 ENCOUNTER — TELEPHONE (OUTPATIENT)
Dept: FAMILY MEDICINE CLINIC | Facility: CLINIC | Age: 47
End: 2022-09-14

## 2022-09-14 NOTE — TELEPHONE ENCOUNTER
Patient brought in FMLA forms to take care of his wife Juarez 10/3/1971.  [id number]    Took $25 payment made copies of forms and placed original in Triage

## 2023-02-06 ENCOUNTER — LAB ENCOUNTER (OUTPATIENT)
Dept: LAB | Age: 48
End: 2023-02-06
Attending: FAMILY MEDICINE
Payer: COMMERCIAL

## 2023-02-06 ENCOUNTER — OFFICE VISIT (OUTPATIENT)
Dept: FAMILY MEDICINE CLINIC | Facility: CLINIC | Age: 48
End: 2023-02-06
Payer: COMMERCIAL

## 2023-02-06 VITALS
BODY MASS INDEX: 32.8 KG/M2 | HEART RATE: 67 BPM | DIASTOLIC BLOOD PRESSURE: 86 MMHG | SYSTOLIC BLOOD PRESSURE: 122 MMHG | RESPIRATION RATE: 18 BRPM | HEIGHT: 67 IN | OXYGEN SATURATION: 100 % | WEIGHT: 209 LBS

## 2023-02-06 DIAGNOSIS — Z00.00 ROUTINE GENERAL MEDICAL EXAMINATION AT A HEALTH CARE FACILITY: Primary | ICD-10-CM

## 2023-02-06 DIAGNOSIS — Z13.29 SCREENING FOR ENDOCRINE, NUTRITIONAL, METABOLIC AND IMMUNITY DISORDER: ICD-10-CM

## 2023-02-06 DIAGNOSIS — Z13.228 SCREENING FOR ENDOCRINE, NUTRITIONAL, METABOLIC AND IMMUNITY DISORDER: ICD-10-CM

## 2023-02-06 DIAGNOSIS — Z13.0 SCREENING FOR ENDOCRINE, NUTRITIONAL, METABOLIC AND IMMUNITY DISORDER: ICD-10-CM

## 2023-02-06 DIAGNOSIS — S29.011A MUSCLE STRAIN OF CHEST WALL, INITIAL ENCOUNTER: ICD-10-CM

## 2023-02-06 DIAGNOSIS — Z13.21 SCREENING FOR ENDOCRINE, NUTRITIONAL, METABOLIC AND IMMUNITY DISORDER: ICD-10-CM

## 2023-02-06 DIAGNOSIS — Z12.5 ENCOUNTER FOR PROSTATE CANCER SCREENING: ICD-10-CM

## 2023-02-06 DIAGNOSIS — Z12.11 SCREENING FOR COLON CANCER: ICD-10-CM

## 2023-02-06 LAB
ALBUMIN SERPL-MCNC: 3.9 G/DL (ref 3.4–5)
ALBUMIN/GLOB SERPL: 1.3 {RATIO} (ref 1–2)
ALP LIVER SERPL-CCNC: 63 U/L
ALT SERPL-CCNC: 34 U/L
ANION GAP SERPL CALC-SCNC: 0 MMOL/L (ref 0–18)
AST SERPL-CCNC: 13 U/L (ref 15–37)
BASOPHILS # BLD AUTO: 0.09 X10(3) UL (ref 0–0.2)
BASOPHILS NFR BLD AUTO: 1.5 %
BILIRUB SERPL-MCNC: 0.4 MG/DL (ref 0.1–2)
BUN BLD-MCNC: 13 MG/DL (ref 7–18)
CALCIUM BLD-MCNC: 9 MG/DL (ref 8.5–10.1)
CHLORIDE SERPL-SCNC: 107 MMOL/L (ref 98–112)
CHOLEST SERPL-MCNC: 209 MG/DL (ref ?–200)
CO2 SERPL-SCNC: 29 MMOL/L (ref 21–32)
COMPLEXED PSA SERPL-MCNC: 1.04 NG/ML (ref ?–4)
CREAT BLD-MCNC: 0.87 MG/DL
EOSINOPHIL # BLD AUTO: 0.11 X10(3) UL (ref 0–0.7)
EOSINOPHIL NFR BLD AUTO: 1.9 %
ERYTHROCYTE [DISTWIDTH] IN BLOOD BY AUTOMATED COUNT: 13.2 %
FASTING PATIENT LIPID ANSWER: YES
FASTING STATUS PATIENT QL REPORTED: YES
GFR SERPLBLD BASED ON 1.73 SQ M-ARVRAT: 107 ML/MIN/1.73M2 (ref 60–?)
GLOBULIN PLAS-MCNC: 3 G/DL (ref 2.8–4.4)
GLUCOSE BLD-MCNC: 96 MG/DL (ref 70–99)
HCT VFR BLD AUTO: 49.2 %
HDLC SERPL-MCNC: 55 MG/DL (ref 40–59)
HGB BLD-MCNC: 16.2 G/DL
IMM GRANULOCYTES # BLD AUTO: 0.06 X10(3) UL (ref 0–1)
IMM GRANULOCYTES NFR BLD: 1 %
LDLC SERPL CALC-MCNC: 133 MG/DL (ref ?–100)
LYMPHOCYTES # BLD AUTO: 1.37 X10(3) UL (ref 1–4)
LYMPHOCYTES NFR BLD AUTO: 23.1 %
MCH RBC QN AUTO: 31 PG (ref 26–34)
MCHC RBC AUTO-ENTMCNC: 32.9 G/DL (ref 31–37)
MCV RBC AUTO: 94.1 FL
MONOCYTES # BLD AUTO: 0.5 X10(3) UL (ref 0.1–1)
MONOCYTES NFR BLD AUTO: 8.4 %
NEUTROPHILS # BLD AUTO: 3.81 X10 (3) UL (ref 1.5–7.7)
NEUTROPHILS # BLD AUTO: 3.81 X10(3) UL (ref 1.5–7.7)
NEUTROPHILS NFR BLD AUTO: 64.1 %
NONHDLC SERPL-MCNC: 154 MG/DL (ref ?–130)
OSMOLALITY SERPL CALC.SUM OF ELEC: 282 MOSM/KG (ref 275–295)
PLATELET # BLD AUTO: 256 10(3)UL (ref 150–450)
POTASSIUM SERPL-SCNC: 4 MMOL/L (ref 3.5–5.1)
PROT SERPL-MCNC: 6.9 G/DL (ref 6.4–8.2)
RBC # BLD AUTO: 5.23 X10(6)UL
SODIUM SERPL-SCNC: 136 MMOL/L (ref 136–145)
TRIGL SERPL-MCNC: 119 MG/DL (ref 30–149)
TSI SER-ACNC: 2.82 MIU/ML (ref 0.36–3.74)
VIT D+METAB SERPL-MCNC: 10.9 NG/ML (ref 30–100)
VLDLC SERPL CALC-MCNC: 22 MG/DL (ref 0–30)
WBC # BLD AUTO: 5.9 X10(3) UL (ref 4–11)

## 2023-02-06 PROCEDURE — 85025 COMPLETE CBC W/AUTO DIFF WBC: CPT

## 2023-02-06 PROCEDURE — 80053 COMPREHEN METABOLIC PANEL: CPT

## 2023-02-06 PROCEDURE — 36415 COLL VENOUS BLD VENIPUNCTURE: CPT

## 2023-02-06 PROCEDURE — 84443 ASSAY THYROID STIM HORMONE: CPT

## 2023-02-06 PROCEDURE — 82306 VITAMIN D 25 HYDROXY: CPT

## 2023-02-06 PROCEDURE — 80061 LIPID PANEL: CPT

## 2023-02-06 RX ORDER — METHYLPREDNISOLONE 4 MG/1
TABLET ORAL
Qty: 1 EACH | Refills: 0 | Status: SHIPPED | OUTPATIENT
Start: 2023-02-06

## 2023-02-06 RX ORDER — CYCLOBENZAPRINE HCL 5 MG
5 TABLET ORAL 3 TIMES DAILY PRN
Qty: 30 TABLET | Refills: 3 | Status: SHIPPED | OUTPATIENT
Start: 2023-02-06

## 2023-02-07 ENCOUNTER — TELEPHONE (OUTPATIENT)
Dept: FAMILY MEDICINE CLINIC | Facility: CLINIC | Age: 48
End: 2023-02-07

## 2023-02-07 ENCOUNTER — APPOINTMENT (OUTPATIENT)
Dept: LAB | Age: 48
End: 2023-02-07
Attending: FAMILY MEDICINE
Payer: COMMERCIAL

## 2023-02-07 DIAGNOSIS — E55.9 VITAMIN D DEFICIENCY: Primary | ICD-10-CM

## 2023-02-07 RX ORDER — ERGOCALCIFEROL 1.25 MG/1
50000 CAPSULE ORAL WEEKLY
Qty: 4 CAPSULE | Refills: 5 | Status: SHIPPED | OUTPATIENT
Start: 2023-02-07 | End: 2023-03-09

## 2023-02-07 NOTE — TELEPHONE ENCOUNTER
----- Message from Cate Oglesby MD sent at 2/7/2023 11:22 AM CST -----  Overall very stable, please send 4 tablets of vit. D with 5 refills, extremely low vit. D level, ok to recheck the level in like 4 months.

## 2023-02-11 ENCOUNTER — PATIENT MESSAGE (OUTPATIENT)
Dept: FAMILY MEDICINE CLINIC | Facility: CLINIC | Age: 48
End: 2023-02-11

## 2023-02-11 DIAGNOSIS — Z00.00 ENCOUNTER FOR BLOOD TEST FOR ROUTINE GENERAL PHYSICAL EXAMINATION: Primary | ICD-10-CM

## 2023-02-13 NOTE — TELEPHONE ENCOUNTER
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 Please advise. Thank you.    LOV: 02/06/23

## 2023-02-13 NOTE — TELEPHONE ENCOUNTER
From: Grace Rosenthal  Sent: 2/11/2023 9:27 AM CST  To: [facility] Clinical Staff  Subject: results    I was looking through my results and could not find my blood type. At work, - , I need to place a Tin  patch inside my vest of my blood type in case of a medical emergency. Can you please relay this to me.    Car Cunha

## 2023-07-19 ENCOUNTER — TELEPHONE (OUTPATIENT)
Dept: FAMILY MEDICINE CLINIC | Facility: CLINIC | Age: 48
End: 2023-07-19

## 2023-07-19 DIAGNOSIS — R10.9 ABDOMINAL PAIN DUE TO INJURY: Primary | ICD-10-CM

## 2023-07-19 NOTE — TELEPHONE ENCOUNTER
CT order placed and pt notified. Provided number for Central Scheduling. Pt to keep OV with NP on 7/22.

## 2023-07-19 NOTE — TELEPHONE ENCOUNTER
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 Tuesday morning he woke up with pain on lower abdomin pt has a history of hernias with repair, I scheduled him an appt to see Gerard Dubois on 07/22/23. Pt stated Dr. Kris Paul is aware oh his history and is wondering if she might want to see him sooner.

## 2023-07-19 NOTE — TELEPHONE ENCOUNTER
Pt requesting imaging orders r/t lower abd. Pain post fall 7/17. OV set for 7/22 w/ Meg Jaramillo. Hx of hernias. Please advise. Will provide ER precautions. LOV 2/6/23.

## 2023-07-20 ENCOUNTER — HOSPITAL ENCOUNTER (OUTPATIENT)
Dept: CT IMAGING | Facility: HOSPITAL | Age: 48
Discharge: HOME | End: 2023-07-20
Attending: FAMILY MEDICINE
Payer: COMMERCIAL

## 2023-07-20 DIAGNOSIS — R10.9 ABDOMINAL PAIN DUE TO INJURY: ICD-10-CM

## 2023-07-20 LAB
CREAT BLD-MCNC: 0.9 MG/DL
EGFRCR SERPLBLD CKD-EPI 2021: 106 ML/MIN/1.73M2 (ref 60–?)

## 2023-07-20 PROCEDURE — 82565 ASSAY OF CREATININE: CPT

## 2023-07-20 PROCEDURE — 74174 CTA ABD&PLVS W/CONTRAST: CPT | Performed by: FAMILY MEDICINE

## 2023-07-22 ENCOUNTER — OFFICE VISIT (OUTPATIENT)
Dept: FAMILY MEDICINE CLINIC | Facility: CLINIC | Age: 48
End: 2023-07-22
Payer: COMMERCIAL

## 2023-07-22 VITALS
HEIGHT: 67 IN | RESPIRATION RATE: 16 BRPM | WEIGHT: 210 LBS | SYSTOLIC BLOOD PRESSURE: 110 MMHG | OXYGEN SATURATION: 98 % | BODY MASS INDEX: 32.96 KG/M2 | DIASTOLIC BLOOD PRESSURE: 80 MMHG | HEART RATE: 68 BPM

## 2023-07-22 DIAGNOSIS — S39.011A STRAIN OF ABDOMINAL MUSCLE, INITIAL ENCOUNTER: Primary | ICD-10-CM

## 2023-07-22 PROCEDURE — 3079F DIAST BP 80-89 MM HG: CPT | Performed by: NURSE PRACTITIONER

## 2023-07-22 PROCEDURE — 3008F BODY MASS INDEX DOCD: CPT | Performed by: NURSE PRACTITIONER

## 2023-07-22 PROCEDURE — 3074F SYST BP LT 130 MM HG: CPT | Performed by: NURSE PRACTITIONER

## 2023-07-22 PROCEDURE — 99214 OFFICE O/P EST MOD 30 MIN: CPT | Performed by: NURSE PRACTITIONER

## 2023-07-22 RX ORDER — METHYLPREDNISOLONE 4 MG/1
TABLET ORAL
Qty: 21 EACH | Refills: 0 | Status: SHIPPED | OUTPATIENT
Start: 2023-07-22

## 2023-07-22 RX ORDER — CYCLOBENZAPRINE HCL 10 MG
5 TABLET ORAL NIGHTLY
Qty: 30 TABLET | Refills: 0 | Status: SHIPPED | OUTPATIENT
Start: 2023-07-22

## 2023-12-09 ENCOUNTER — OFFICE VISIT (OUTPATIENT)
Dept: FAMILY MEDICINE CLINIC | Facility: CLINIC | Age: 48
End: 2023-12-09
Payer: COMMERCIAL

## 2023-12-09 VITALS
HEIGHT: 67 IN | SYSTOLIC BLOOD PRESSURE: 120 MMHG | RESPIRATION RATE: 16 BRPM | OXYGEN SATURATION: 99 % | HEART RATE: 69 BPM | WEIGHT: 205 LBS | DIASTOLIC BLOOD PRESSURE: 80 MMHG | BODY MASS INDEX: 32.18 KG/M2

## 2023-12-09 DIAGNOSIS — Z12.11 SCREENING FOR COLON CANCER: ICD-10-CM

## 2023-12-09 DIAGNOSIS — Z00.00 LABORATORY EXAMINATION ORDERED AS PART OF A ROUTINE GENERAL MEDICAL EXAMINATION: ICD-10-CM

## 2023-12-09 DIAGNOSIS — M54.2 CERVICALGIA: Primary | ICD-10-CM

## 2023-12-09 DIAGNOSIS — E55.9 VITAMIN D DEFICIENCY: ICD-10-CM

## 2023-12-09 DIAGNOSIS — Z12.5 ENCOUNTER FOR PROSTATE CANCER SCREENING: ICD-10-CM

## 2023-12-09 RX ORDER — METHYLPREDNISOLONE 4 MG/1
TABLET ORAL
Qty: 1 EACH | Refills: 0 | Status: SHIPPED | OUTPATIENT
Start: 2023-12-09

## 2024-01-04 ENCOUNTER — APPOINTMENT (OUTPATIENT)
Dept: LAB | Age: 49
End: 2024-01-04
Attending: FAMILY MEDICINE
Payer: COMMERCIAL

## 2024-01-10 ENCOUNTER — TELEPHONE (OUTPATIENT)
Dept: FAMILY MEDICINE CLINIC | Facility: CLINIC | Age: 49
End: 2024-01-10

## 2024-01-10 NOTE — TELEPHONE ENCOUNTER
Notified the patient of the below FIT results. Patient verbalized understanding. Answered all questions at this time.

## 2024-01-18 ENCOUNTER — LAB ENCOUNTER (OUTPATIENT)
Dept: LAB | Age: 49
End: 2024-01-18
Attending: FAMILY MEDICINE
Payer: COMMERCIAL

## 2024-01-18 DIAGNOSIS — E55.9 VITAMIN D DEFICIENCY: ICD-10-CM

## 2024-01-18 DIAGNOSIS — Z12.5 ENCOUNTER FOR PROSTATE CANCER SCREENING: ICD-10-CM

## 2024-01-18 DIAGNOSIS — Z00.00 LABORATORY EXAMINATION ORDERED AS PART OF A ROUTINE GENERAL MEDICAL EXAMINATION: ICD-10-CM

## 2024-01-18 LAB
ALBUMIN SERPL-MCNC: 3.6 G/DL (ref 3.4–5)
ALBUMIN/GLOB SERPL: 1.2 {RATIO} (ref 1–2)
ALP LIVER SERPL-CCNC: 57 U/L
ALT SERPL-CCNC: 33 U/L
ANION GAP SERPL CALC-SCNC: 2 MMOL/L (ref 0–18)
AST SERPL-CCNC: 16 U/L (ref 15–37)
BASOPHILS # BLD AUTO: 0.06 X10(3) UL (ref 0–0.2)
BASOPHILS NFR BLD AUTO: 1 %
BILIRUB SERPL-MCNC: 0.4 MG/DL (ref 0.1–2)
BUN BLD-MCNC: 17 MG/DL (ref 9–23)
CALCIUM BLD-MCNC: 8.7 MG/DL (ref 8.5–10.1)
CHLORIDE SERPL-SCNC: 112 MMOL/L (ref 98–112)
CHOLEST SERPL-MCNC: 213 MG/DL (ref ?–200)
CO2 SERPL-SCNC: 27 MMOL/L (ref 21–32)
COMPLEXED PSA SERPL-MCNC: 0.71 NG/ML (ref ?–4)
CREAT BLD-MCNC: 0.82 MG/DL
EGFRCR SERPLBLD CKD-EPI 2021: 109 ML/MIN/1.73M2 (ref 60–?)
EOSINOPHIL # BLD AUTO: 0.07 X10(3) UL (ref 0–0.7)
EOSINOPHIL NFR BLD AUTO: 1.1 %
ERYTHROCYTE [DISTWIDTH] IN BLOOD BY AUTOMATED COUNT: 13.1 %
FASTING PATIENT LIPID ANSWER: YES
FASTING STATUS PATIENT QL REPORTED: YES
GLOBULIN PLAS-MCNC: 3.1 G/DL (ref 2.8–4.4)
GLUCOSE BLD-MCNC: 111 MG/DL (ref 70–99)
HCT VFR BLD AUTO: 45.7 %
HDLC SERPL-MCNC: 52 MG/DL (ref 40–59)
HGB BLD-MCNC: 15.2 G/DL
IMM GRANULOCYTES # BLD AUTO: 0.08 X10(3) UL (ref 0–1)
IMM GRANULOCYTES NFR BLD: 1.3 %
LDLC SERPL CALC-MCNC: 144 MG/DL (ref ?–100)
LYMPHOCYTES # BLD AUTO: 1.48 X10(3) UL (ref 1–4)
LYMPHOCYTES NFR BLD AUTO: 24.3 %
MCH RBC QN AUTO: 30.2 PG (ref 26–34)
MCHC RBC AUTO-ENTMCNC: 33.3 G/DL (ref 31–37)
MCV RBC AUTO: 90.9 FL
MONOCYTES # BLD AUTO: 0.42 X10(3) UL (ref 0.1–1)
MONOCYTES NFR BLD AUTO: 6.9 %
NEUTROPHILS # BLD AUTO: 3.98 X10 (3) UL (ref 1.5–7.7)
NEUTROPHILS # BLD AUTO: 3.98 X10(3) UL (ref 1.5–7.7)
NEUTROPHILS NFR BLD AUTO: 65.4 %
NONHDLC SERPL-MCNC: 161 MG/DL (ref ?–130)
OSMOLALITY SERPL CALC.SUM OF ELEC: 294 MOSM/KG (ref 275–295)
PLATELET # BLD AUTO: 261 10(3)UL (ref 150–450)
POTASSIUM SERPL-SCNC: 4.3 MMOL/L (ref 3.5–5.1)
PROT SERPL-MCNC: 6.7 G/DL (ref 6.4–8.2)
RBC # BLD AUTO: 5.03 X10(6)UL
SODIUM SERPL-SCNC: 141 MMOL/L (ref 136–145)
TRIGL SERPL-MCNC: 96 MG/DL (ref 30–149)
TSI SER-ACNC: 1.81 MIU/ML (ref 0.36–3.74)
VIT D+METAB SERPL-MCNC: 19.4 NG/ML (ref 30–100)
VLDLC SERPL CALC-MCNC: 18 MG/DL (ref 0–30)
WBC # BLD AUTO: 6.1 X10(3) UL (ref 4–11)

## 2024-01-18 PROCEDURE — 80053 COMPREHEN METABOLIC PANEL: CPT

## 2024-01-18 PROCEDURE — 82306 VITAMIN D 25 HYDROXY: CPT

## 2024-01-18 PROCEDURE — 36415 COLL VENOUS BLD VENIPUNCTURE: CPT

## 2024-01-18 PROCEDURE — 84443 ASSAY THYROID STIM HORMONE: CPT

## 2024-01-18 PROCEDURE — 85025 COMPLETE CBC W/AUTO DIFF WBC: CPT

## 2024-01-18 PROCEDURE — 80061 LIPID PANEL: CPT

## 2024-01-22 RX ORDER — ERGOCALCIFEROL 1.25 MG/1
50000 CAPSULE ORAL WEEKLY
Qty: 4 CAPSULE | Refills: 4 | Status: SHIPPED | OUTPATIENT
Start: 2024-01-22

## 2024-02-01 ENCOUNTER — OFFICE VISIT (OUTPATIENT)
Dept: FAMILY MEDICINE CLINIC | Facility: CLINIC | Age: 49
End: 2024-02-01
Payer: COMMERCIAL

## 2024-02-01 VITALS
WEIGHT: 206 LBS | BODY MASS INDEX: 32.33 KG/M2 | SYSTOLIC BLOOD PRESSURE: 118 MMHG | HEART RATE: 70 BPM | DIASTOLIC BLOOD PRESSURE: 82 MMHG | HEIGHT: 67 IN | RESPIRATION RATE: 21 BRPM | OXYGEN SATURATION: 98 %

## 2024-02-01 DIAGNOSIS — E78.00 PURE HYPERCHOLESTEROLEMIA: ICD-10-CM

## 2024-02-01 DIAGNOSIS — Z00.00 ROUTINE GENERAL MEDICAL EXAMINATION AT A HEALTH CARE FACILITY: Primary | ICD-10-CM

## 2024-02-01 DIAGNOSIS — D23.5 DERMOID CYST OF SKIN OF BACK: ICD-10-CM

## 2024-02-01 PROCEDURE — 99213 OFFICE O/P EST LOW 20 MIN: CPT | Performed by: FAMILY MEDICINE

## 2024-02-01 PROCEDURE — 3008F BODY MASS INDEX DOCD: CPT | Performed by: FAMILY MEDICINE

## 2024-02-01 PROCEDURE — 3074F SYST BP LT 130 MM HG: CPT | Performed by: FAMILY MEDICINE

## 2024-02-01 PROCEDURE — 99396 PREV VISIT EST AGE 40-64: CPT | Performed by: FAMILY MEDICINE

## 2024-02-01 PROCEDURE — 3079F DIAST BP 80-89 MM HG: CPT | Performed by: FAMILY MEDICINE

## 2024-02-01 NOTE — PROGRESS NOTES
Jayro Franks is a 49 year old male who presents for a complete physical exam.   HPI:        Chief Complaint   Patient presents with    Well Adult     Annual Physical        Patient is present for complete physical. Feels very well. Up to date with dental visits.No hearing problems. Vaccinations: up to date.  Pt has in lower back, cyst, got it before.Growing bigger now.  Pt present for hyperlipidemia. Currently asymptomatic, no chest pains, jaw pains, arm pains. No skin lesions.  No SOB on exertion or rest.  Current treatment: no medications.  Pt does not smoke. Pts father used to smoke, has one stent in coronary artery.    Wt Readings from Last 3 Encounters:   02/01/24 206 lb (93.4 kg)   12/09/23 205 lb (93 kg)   07/22/23 210 lb (95.3 kg)      BP Readings from Last 3 Encounters:   02/01/24 118/82   12/09/23 120/80   07/22/23 110/80       Cholesterol, Total (mg/dL)   Date Value   01/18/2024 213 (H)   02/06/2023 209 (H)   07/26/2019 224 (H)     CHOLESTEROL, TOTAL (mg/dL)   Date Value   01/06/2012 217 (H)     CHOLESTEROL (mg/dL)   Date Value   08/08/2014 245 (H)     HDL Cholesterol (mg/dL)   Date Value   01/18/2024 52   02/06/2023 55   07/26/2019 56     HDL CHOLESTEROL (mg/dL)   Date Value   01/06/2012 56     HDL CHOL (mg/dL)   Date Value   08/08/2014 56     LDL Cholesterol (mg/dL)   Date Value   01/18/2024 144 (H)   02/06/2023 133 (H)   07/26/2019 146 (H)     LDL-CHOLESTEROL (mg/dL (calc))   Date Value   01/06/2012 130 (H)     LDL CHOLESTROL (mg/dL)   Date Value   08/08/2014 164 (H)     AST (U/L)   Date Value   01/18/2024 16   02/06/2023 13 (L)   10/08/2019 12 (L)   08/08/2014 14 (L)   01/06/2012 16     ALT (U/L)   Date Value   01/18/2024 33   02/06/2023 34   10/08/2019 28   08/08/2014 32   01/06/2012 26      Current Outpatient Medications   Medication Sig Dispense Refill    ergocalciferol 1.25 MG (52299 UT) Oral Cap Take 1 capsule (50,000 Units total) by mouth once a week. 4 capsule 4    cyclobenzaprine 10  MG Oral Tab Take 0.5 tablets (5 mg total) by mouth nightly. (Patient taking differently: Take 0.5 tablets (5 mg total) by mouth nightly as needed.) 30 tablet 0    Loratadine (CLARITIN REDITABS OR) Take by mouth daily.        Past Medical History:   Diagnosis Date    Allergic rhinitis     Erectile dysfunction     TMJ (dislocation of temporomandibular joint)     Vitamin D deficiency       Past Surgical History:   Procedure Laterality Date    HERNIA SURGERY  1/1/99    inguinal.    LAPAROSCOPIC REPAIR OF INITIAL Right 3/25/2016    Procedure: LAPAROSCOPIC INGUINAL HERNIORRAPHY;  Surgeon: Adan Garcia MD;  Location: [facility], [facility]      Family History   Problem Relation Age of Onset    Heart Disorder Father     Cancer Other         family hx    Other (CAD) Maternal Grandfather     Other (CAD) Paternal Grandfather     Diabetes Maternal Grandmother       Social History     Socioeconomic History    Marital status:    Tobacco Use    Smoking status: Never    Smokeless tobacco: Never   Vaping Use    Vaping Use: Never used   Substance and Sexual Activity    Alcohol use: Yes     Alcohol/week: 1.7 standard drinks of alcohol     Types: 2 Standard drinks or equivalent per week     Comment: very rare     Drug use: Never   Other Topics Concern    Caffeine Concern Yes    Exercise No    Seat Belt Yes          REVIEW OF SYSTEMS:   GENERAL HEALTH: feels well, no fatigue.  SKIN: denies any unusual skin lesions or rashes  EYES: no visual complaints or deficits  HEENT: denies nasal congestion, sinus pain or sore throat; hearing loss negative.  RESPIRATORY: denies shortness of breath, wheezing or cough   CARDIOVASCULAR: denies chest pain, SOB on exertion,no orthopnea, no edema, no palpitations   GI: denies nausea, vomiting, constipation, diarrhea; no rectal bleeding; no heartburn  GENITAL/: no dysuria, urgency or frequency; no epididymal or testicular pain; no penile discharge; no hernias; no erectile dysfunction; no  nocturia  MUSCULOSKELETAL: no joint complaints upper or lower extremities  NEURO: no sensory or motor complaint  PSYCH: no symptoms of depression or anxiety, no insomnia.  HEMATOLOGY: denies hx anemia; denies bruising or excessive bleeding  ENDOCRINE: denies excessive thirst or urination; denies unexpected wt gain or wt loss  ALLERGY/IMM.: denies food or seasonal allergies  PSYCH: no anxiety, depression, mood issues.    EXAM:   /82   Pulse 70   Resp 21   Ht 5' 7\" (1.702 m)   Wt 206 lb (93.4 kg)   SpO2 98%   BMI 32.26 kg/m²     General: WD/WN in no acute distress.   HEENT: PERRLA and EOMI.  OP moist no lesions. TM normal, canals normal.  NECK: is supple,thyroid- normal. No carotid bruits.    Heart: is RRR.  S1, S2, with no murmurs.    Lungs: are clear to auscultation bilaterally, with no wheeze, rhonchi, or rales.  Heart: is RRR.  S1, S2, with no murmurs.    Abdomen: is soft, NT/ND with no HSM.  No rebound or guarding, NABS.    Extremities: are symmetric with no cyanosis, clubbing, or edema.    Skin: is unremarkable without rashes. L flank: 2cm dermoit cyst present.   Neuro: no focal abnormalities, and reflexes coordination and gait normal and symmetric.   MUSK/SKEL: Normal muscles tones, no joint abnormalities, full ROM of all extremities.    back- normal curves, no scoliosis, normal gait,  No joint or mulses abnormalities.  Psych: pt is alert, oriented x 3, normal affect.      ASSESSMENT AND PLAN:     Jayro Franks is a 49 year old male who presents for a complete physical exam.   Pt's weight is Body mass index is 32.26 kg/m²., recommended low fat diet and aerobic exercise 30 minutes three times weekly.       Pure hypercholesterolemia  Problem  well controlled. Pt recommended to consume a low fat diet and to exercise aerobically at least three times per week.   The patient indicates understanding of these issues and agrees to the plan.  The patient is asked to return for lipids check in 4  months.    Dermoid cyst of skin of back  -     SURGERY - INTERNAL          The patient indicates understanding of these issues and agrees to the plan.  The patient is asked to return for CPX in 1 year or sooner if needed.

## 2024-02-29 ENCOUNTER — OFFICE VISIT (OUTPATIENT)
Facility: LOCATION | Age: 49
End: 2024-02-29
Payer: COMMERCIAL

## 2024-02-29 VITALS — TEMPERATURE: 97 F | HEART RATE: 67 BPM

## 2024-02-29 DIAGNOSIS — L72.3 SEBACEOUS CYST: Primary | ICD-10-CM

## 2024-02-29 PROCEDURE — 99203 OFFICE O/P NEW LOW 30 MIN: CPT | Performed by: SURGERY

## 2024-02-29 NOTE — H&P
New Patient Visit Note       Active Problems      1. Sebaceous cyst        Chief Complaint   Chief Complaint   Patient presents with    New Patient     NP - Dermoid cyst of skin of back, small/medium size, rub against stuff some irritation, soft texture, discomfort, no other symptoms.       History of Present Illness     The patient presents to request of his primary care physician for evaluation of a dermoid cyst of the upper back.  Patient works as a  and he states that the area is irritated by his vest and his work equipment.  The patient wishes excision.  Patient has had inflammation in the area intermittently over time.    The patient denies fever, chills, chest pain, shortness of breath, dyspnea. The patient also denies hematemesis, melena, or hematochezia. The patient denies change in bowel or bladder habits. There is no complaint of hematuria or dysuria.    I discussed the risk, benefits, alternatives of surgery.  I discussed the anticipated postoperative recovery including dietary, activity, exercise, and lifestyle recommendations.  The patient's questions regarding surgical procedure were answered and the patient voiced understanding of the care plan.    Allergies  Jayro is allergic to amoxicillin.    Past Medical / Surgical / Social / Family History    The past medical and past surgical history have been reviewed by me today.    Past Medical History:   Diagnosis Date    Allergic rhinitis     Erectile dysfunction     TMJ (dislocation of temporomandibular joint)     Vitamin D deficiency      Past Surgical History:   Procedure Laterality Date    HERNIA SURGERY  1/1/99    inguinal.    LAPAROSCOPIC REPAIR OF INITIAL Right 3/25/2016    Procedure: LAPAROSCOPIC INGUINAL HERNIORRAPHY;  Surgeon: Adan Garcia MD;  Location: [facility]       The family history and social history have been reviewed by me today.    Family History   Problem Relation Age of Onset    Heart Disorder Father      Cancer Other         family hx    Other (CAD) Maternal Grandfather     Other (CAD) Paternal Grandfather     Diabetes Maternal Grandmother      Social History     Socioeconomic History    Marital status:    Tobacco Use    Smoking status: Never    Smokeless tobacco: Never   Vaping Use    Vaping Use: Never used   Substance and Sexual Activity    Alcohol use: Yes     Alcohol/week: 1.7 standard drinks of alcohol     Types: 2 Standard drinks or equivalent per week     Comment: very rare     Drug use: Never   Other Topics Concern    Caffeine Concern Yes    Exercise No    Seat Belt Yes        Current Outpatient Medications:     ergocalciferol 1.25 MG (78692 UT) Oral Cap, Take 1 capsule (50,000 Units total) by mouth once a week., Disp: 4 capsule, Rfl: 4    cyclobenzaprine 10 MG Oral Tab, Take 0.5 tablets (5 mg total) by mouth nightly. (Patient taking differently: Take 0.5 tablets (5 mg total) by mouth nightly as needed.), Disp: 30 tablet, Rfl: 0    Loratadine (CLARITIN REDITABS OR), Take by mouth daily., Disp: , Rfl:       Review of Systems  The Review of Systems has been reviewed by me during today.  Review of Systems   Constitutional: Negative.    HENT: Negative.     Eyes: Negative.    Respiratory: Negative.     Cardiovascular: Negative.    Gastrointestinal: Negative.    Genitourinary: Negative.    Musculoskeletal: Negative.    Skin: Negative.    Neurological: Negative.    Psychiatric/Behavioral: Negative.         Physical Findings   Pulse 67   Temp 97.2 °F (36.2 °C) (Temporal)   Physical Exam  Vitals and nursing note reviewed.   Constitutional:       General: He is not in acute distress.     Appearance: He is well-developed.   HENT:      Head: Normocephalic and atraumatic.   Eyes:      General: No scleral icterus.     Pupils: Pupils are equal, round, and reactive to light.   Neck:      Vascular: No JVD.      Trachea: No tracheal deviation.   Cardiovascular:      Rate and Rhythm: Normal rate and regular rhythm.    Pulmonary:      Effort: Pulmonary effort is normal. No respiratory distress.      Breath sounds: No stridor.       Abdominal:      General: Bowel sounds are normal. There is no distension.      Palpations: Abdomen is soft. Abdomen is not rigid. There is no mass.      Tenderness: There is no abdominal tenderness. There is no guarding or rebound. Negative signs include Medina's sign and McBurney's sign.      Hernia: No hernia is present.   Musculoskeletal:         General: Normal range of motion.      Cervical back: Normal range of motion and neck supple.   Skin:     General: Skin is warm and dry.   Neurological:      Mental Status: He is alert and oriented to person, place, and time.   Psychiatric:         Behavior: Behavior normal.             Assessment   1. Sebaceous cyst          Plan     The patient will be scheduled for in -office excision of epidermoid cyst with layered closure.    The kelsey-operative care plan was discussed with the patient, who voices understanding.  Activity and lifting recommendations were discussed in length.     The risks, benefits, and alternatives to the procedure were explained to the patient.  The risks explained include, but are not limited to, bleeding, infection, pain wound complications, recurrence, incorrect diagnosis, injury to adjacent organs and structures. We also discussed the possibile need for further therapeutic, diagnostic, or surgical intervention.  The patient voiced understanding, and after all questions were answered to the patient's satisfaction, the patient provided willing and informed consent to proceed.     No orders of the defined types were placed in this encounter.      Imaging & Referrals   None    Follow Up  No follow-ups on file.    Davon Hall MD

## 2024-04-03 ENCOUNTER — OFFICE VISIT (OUTPATIENT)
Facility: LOCATION | Age: 49
End: 2024-04-03
Payer: COMMERCIAL

## 2024-04-03 ENCOUNTER — TELEPHONE (OUTPATIENT)
Facility: LOCATION | Age: 49
End: 2024-04-03

## 2024-04-03 VITALS — TEMPERATURE: 98 F | HEART RATE: 70 BPM

## 2024-04-03 DIAGNOSIS — L72.3 SEBACEOUS CYST: Primary | ICD-10-CM

## 2024-04-03 PROCEDURE — 88304 TISSUE EXAM BY PATHOLOGIST: CPT | Performed by: SURGERY

## 2024-04-03 PROCEDURE — 12032 INTMD RPR S/A/T/EXT 2.6-7.5: CPT | Performed by: SURGERY

## 2024-04-03 PROCEDURE — 11403 EXC TR-EXT B9+MARG 2.1-3CM: CPT | Performed by: SURGERY

## 2024-04-03 NOTE — PROCEDURES
[facility]    Jayro Franks Patient Status:  No patient class for patient encounter    1975 MRN [id number]   Location [facility] Attending No att. providers found   Hosp Day # 0 PCP Lesly Holguin MD     Jayro Franks is a 49 year old male patient.  1. Sebaceous cyst      Past Medical History:   Diagnosis Date    Allergic rhinitis     Erectile dysfunction     TMJ (dislocation of temporomandibular joint)     Vitamin D deficiency      Pulse 70, temperature 97.6 °F (36.4 °C), temperature source Temporal.    Skin excision    Date/Time: 4/3/2024 4:44 PM    Performed by: Davon Hall MD  Authorized by: Davon Hlal MD    Number of Lesions: 1  Lesion 1:     Body area: trunk    Trunk location: back    Initial size (mm): 20    Malignancy: benign lesion      Repair type: linear closure    Closure complexity: intermediate      Surgical defect: 2 cm    Repair size (cm): 3.5    Repair comments: The patient's right upper back is prepped and draped in usual sterile fashion    Lidocaine 1% with epinephrine injected around the area of the lesion.    Full-thickness incision created and skin using scalpel.    Blunt sharp dissection using scalpel, scissors, and forceps to circumferentially dissect the sebaceous cyst from surrounding tissues.    Lesion removed from the operative field and submitted to pathology.    Hemostasis achieved with digital pressure.    Skin closure achieved using 3-0 Vicryl for the deep layer and subcuticular 4-0 Monocryl to reapproximate skin.    Steri-Strips applied.    Wound care instructions discussed.    Patient tolerated the procedure without apparent complication.    Wound check follow-up in 1 week.      Davon Hall MD  4/3/2024

## 2024-04-03 NOTE — PROGRESS NOTES
Follow Up Visit Note       Active Problems      No diagnosis found.      Chief Complaint   Chief Complaint   Patient presents with    [facility]     EP - Sebaceous cyst 2/29/24, Excision of skin lesion procedure in office,          History of Present Illness        Allergies  Jayro is allergic to amoxicillin.    Past Medical / Surgical / Social / Family History    The past medical and past surgical history have been reviewed by me today.    Past Medical History:   Diagnosis Date    Allergic rhinitis     Erectile dysfunction     TMJ (dislocation of temporomandibular joint)     Vitamin D deficiency      Past Surgical History:   Procedure Laterality Date    HERNIA SURGERY  1/1/99    inguinal.    LAPAROSCOPIC REPAIR OF INITIAL Right 3/25/2016    Procedure: LAPAROSCOPIC INGUINAL HERNIORRAPHY;  Surgeon: Adan Garcia MD;  Location: [facility], [facility]       The family history and social history have been reviewed by me today.    Family History   Problem Relation Age of Onset    Heart Disorder Father     Cancer Other         family hx    Other (CAD) Maternal Grandfather     Other (CAD) Paternal Grandfather     Diabetes Maternal Grandmother      Social History     Socioeconomic History    Marital status:    Tobacco Use    Smoking status: Never    Smokeless tobacco: Never   Vaping Use    Vaping Use: Never used   Substance and Sexual Activity    Alcohol use: Yes     Alcohol/week: 1.7 standard drinks of alcohol     Types: 2 Standard drinks or equivalent per week     Comment: very rare     Drug use: Never   Other Topics Concern    Caffeine Concern Yes    Exercise No    Seat Belt Yes        Current Outpatient Medications:     ergocalciferol 1.25 MG (54233 UT) Oral Cap, Take 1 capsule (50,000 Units total) by mouth once a week., Disp: 4 capsule, Rfl: 4    cyclobenzaprine 10 MG Oral Tab, Take 0.5 tablets (5 mg total) by mouth nightly. (Patient taking differently: Take 0.5 tablets (5 mg total) by mouth nightly as  needed.), Disp: 30 tablet, Rfl: 0    Loratadine (CLARITIN REDITABS OR), Take by mouth daily., Disp: , Rfl:      Review of Systems  The Review of Systems has been reviewed by me during today.  Review of Systems   Constitutional: Negative.    HENT: Negative.     Eyes: Negative.    Respiratory: Negative.     Cardiovascular: Negative.    Gastrointestinal: Negative.    Genitourinary: Negative.    Musculoskeletal: Negative.    Skin: Negative.    Neurological: Negative.    Psychiatric/Behavioral: Negative.          Physical Findings   There were no vitals taken for this visit.  Physical Exam     Assessment   No diagnosis found.    Plan        No orders of the defined types were placed in this encounter.      Imaging & Referrals   None    Follow Up  No follow-ups on file.    Davon Hall MD

## 2024-04-10 ENCOUNTER — OFFICE VISIT (OUTPATIENT)
Facility: LOCATION | Age: 49
End: 2024-04-10
Payer: COMMERCIAL

## 2024-04-10 VITALS — TEMPERATURE: 97 F | HEART RATE: 63 BPM

## 2024-04-10 DIAGNOSIS — L72.3 SEBACEOUS CYST: ICD-10-CM

## 2024-04-10 DIAGNOSIS — Z98.890 POSTOPERATIVE STATE: Primary | ICD-10-CM

## 2024-04-10 PROCEDURE — 99024 POSTOP FOLLOW-UP VISIT: CPT

## 2024-04-10 NOTE — PROGRESS NOTES
Follow Up Visit Note       Active Problems      1. Postoperative state    2. Sebaceous cyst          Chief Complaint   Chief Complaint   Patient presents with    Establish Care     EP - 1-wk f/u, after excision of skin lesion, Pt. States excision is red. Pt. Denies pain          History of Present Illness  Jayro Franks is a 49 year old male who presents to clinic for continued care and evaluation following excision of skin lesion on the right middle back on 4/3/2024 by Dr. Hall.     The patient reports doing well postoperatively. He reports some irritation by his incision site due to clothes rubbing against his back. He has been applying Neosporin to the area which provides relief. He denies incisional pain. He continues to wash the area with soap and water daily. He denies fevers or chills.    Allergies  Jayro is allergic to amoxicillin.    Past Medical / Surgical / Social / Family History    The past medical and past surgical history have been reviewed by me today.    Past Medical History:    Allergic rhinitis    Erectile dysfunction    TMJ (dislocation of temporomandibular joint)    Vitamin D deficiency     Past Surgical History:   Procedure Laterality Date    Hernia surgery  1/1/99    inguinal.    Laparoscopic repair of initial Right 3/25/2016    Procedure: LAPAROSCOPIC INGUINAL HERNIORRAPHY;  Surgeon: Adan Garcia MD;  [facility]       The family history and social history have been reviewed by me today.    Family History   Problem Relation Age of Onset    Heart Disorder Father     Cancer Other         family hx    Other (CAD) Maternal Grandfather     Other (CAD) Paternal Grandfather     Diabetes Maternal Grandmother      Social History     Socioeconomic History    Marital status:    Tobacco Use    Smoking status: Never    Smokeless tobacco: Never   Vaping Use    Vaping status: Never Used   Substance and Sexual Activity    Alcohol use: Yes     Alcohol/week: 1.7 standard  drinks of alcohol     Types: 2 Standard drinks or equivalent per week     Comment: very rare     Drug use: Never   Other Topics Concern    Caffeine Concern Yes    Exercise No    Seat Belt Yes        Current Outpatient Medications:     ergocalciferol 1.25 MG (08773 UT) Oral Cap, Take 1 capsule (50,000 Units total) by mouth once a week., Disp: 4 capsule, Rfl: 4    cyclobenzaprine 10 MG Oral Tab, Take 0.5 tablets (5 mg total) by mouth nightly. (Patient taking differently: Take 0.5 tablets (5 mg total) by mouth nightly as needed.), Disp: 30 tablet, Rfl: 0    Loratadine (CLARITIN REDITABS OR), Take by mouth daily., Disp: , Rfl:      Review of Systems  The Review of Systems has been reviewed by me during today.  Review of Systems   Constitutional:  Negative for chills, diaphoresis, fatigue, fever and unexpected weight change.   HENT:  Negative for hearing loss, nosebleeds, sore throat and trouble swallowing.    Respiratory:  Negative for apnea, cough, shortness of breath and wheezing.    Cardiovascular:  Negative for chest pain, palpitations and leg swelling.   Gastrointestinal:  Negative for abdominal distention, abdominal pain, anal bleeding, blood in stool, constipation, diarrhea, nausea and vomiting.   Genitourinary:  Negative for difficulty urinating, dysuria, frequency and urgency.   Musculoskeletal:  Negative for arthralgias and myalgias.   Skin:  Positive for wound. Negative for color change and rash.   Neurological:  Negative for tremors, syncope and weakness.   Hematological:  Negative for adenopathy. Does not bruise/bleed easily.   Psychiatric/Behavioral:  Negative for behavioral problems and sleep disturbance.         Physical Findings   Pulse 63   Temp 97.4 °F (36.3 °C) (Temporal)   Physical Exam  Constitutional:       Appearance: Normal appearance. He is normal weight.   HENT:      Head: Normocephalic and atraumatic.      Nose: Nose normal.   Eyes:      Extraocular Movements: Extraocular movements intact.       Pupils: Pupils are equal, round, and reactive to light.   Cardiovascular:      Rate and Rhythm: Normal rate and regular rhythm.   Pulmonary:      Effort: Pulmonary effort is normal. No respiratory distress.   Abdominal:      General: Abdomen is flat. Bowel sounds are normal. There is no distension.      Palpations: Abdomen is soft. There is no mass.      Tenderness: There is no abdominal tenderness. There is no guarding or rebound.   Musculoskeletal:         General: Normal range of motion.        Arms:       Cervical back: Normal range of motion.      Comments: Incision site continues to heal appropriately. There is some surrounding inflammation. There is no surrounding erythema or cellulitis. There is no drainage. No signs of infection.   Skin:     General: Skin is warm and dry.      Coloration: Skin is not jaundiced or pale.      Findings: No erythema.   Neurological:      General: No focal deficit present.      Mental Status: He is alert and oriented to person, place, and time.   Psychiatric:         Mood and Affect: Mood normal.         Behavior: Behavior normal.         Thought Content: Thought content normal.         Judgment: Judgment normal.       Pathology    Final Diagnosis:   Back:  -Fragments of epidermal inclusion cyst.      Assessment   1. Postoperative state    2. Sebaceous cyst        Plan   Overall, the patient continues to do well postoperatively.  Continue p.o. Tylenol and Motrin as needed for pain control.  Continue to wash incision site with soap and water daily.  He may continue to apply Neosporin to the area as well.  Surgical pathology discussed during today's visit.  He is to follow-up as needed.  All the patient's question concerns were addressed.  He expresses understanding and is in agreement with this plan.     No orders of the defined types were placed in this encounter.      Imaging & Referrals   None    Follow Up  No follow-ups on file.    MAYTE Wade

## 2024-04-15 RX ORDER — ERGOCALCIFEROL 1.25 MG/1
50000 CAPSULE ORAL WEEKLY
Qty: 12 CAPSULE | Refills: 0 | OUTPATIENT
Start: 2024-04-15

## 2024-04-16 RX ORDER — ERGOCALCIFEROL 1.25 MG/1
50000 CAPSULE ORAL WEEKLY
Qty: 4 CAPSULE | Refills: 0 | Status: SHIPPED | OUTPATIENT
Start: 2024-04-16

## 2024-04-16 NOTE — TELEPHONE ENCOUNTER
Patient comment: I have been taking the prescribed Vitamin D2 weekly.  I am due to see Dr. Holguin June 5th 2024. Need additional refill         Last ordered: 2 months ago (1/22/2024) by Lesly Holguin MD

## 2024-05-02 ENCOUNTER — OFFICE VISIT (OUTPATIENT)
Dept: OCCUPATIONAL MEDICINE | Age: 49
End: 2024-05-02
Attending: PHYSICIAN ASSISTANT

## 2024-05-02 ENCOUNTER — HOSPITAL ENCOUNTER (OUTPATIENT)
Dept: GENERAL RADIOLOGY | Age: 49
Discharge: HOME | End: 2024-05-02
Attending: PHYSICIAN ASSISTANT

## 2024-05-02 DIAGNOSIS — S39.012A LOW BACK STRAIN, INITIAL ENCOUNTER: ICD-10-CM

## 2024-05-02 DIAGNOSIS — S39.012A LOW BACK STRAIN, INITIAL ENCOUNTER: Primary | ICD-10-CM

## 2024-05-02 PROCEDURE — 72110 X-RAY EXAM L-2 SPINE 4/>VWS: CPT | Performed by: PHYSICIAN ASSISTANT

## 2024-05-02 RX ORDER — IBUPROFEN 800 MG/1
800 TABLET ORAL 3 TIMES DAILY
Qty: 30 TABLET | Refills: 0 | Status: SHIPPED | OUTPATIENT
Start: 2024-05-02 | End: 2024-06-01

## 2024-05-02 RX ORDER — CYCLOBENZAPRINE HCL 10 MG
10 TABLET ORAL NIGHTLY
Qty: 20 TABLET | Refills: 0 | Status: SHIPPED | OUTPATIENT
Start: 2024-05-02 | End: 2024-05-22

## 2024-05-06 ENCOUNTER — TELEPHONE (OUTPATIENT)
Dept: FAMILY MEDICINE CLINIC | Facility: CLINIC | Age: 49
End: 2024-05-06

## 2024-05-06 DIAGNOSIS — M54.31 BILATERAL SCIATICA: Primary | ICD-10-CM

## 2024-05-06 DIAGNOSIS — M54.32 BILATERAL SCIATICA: Primary | ICD-10-CM

## 2024-05-06 RX ORDER — METHYLPREDNISOLONE 4 MG/1
TABLET ORAL
Qty: 1 EACH | Refills: 0 | Status: SHIPPED | OUTPATIENT
Start: 2024-05-06

## 2024-05-06 RX ORDER — ACETAMINOPHEN AND CODEINE PHOSPHATE 300; 30 MG/1; MG/1
1 TABLET ORAL EVERY 6 HOURS PRN
Qty: 30 TABLET | Refills: 0 | Status: SHIPPED | OUTPATIENT
Start: 2024-05-06

## 2024-05-06 NOTE — TELEPHONE ENCOUNTER
Ok to send Medrol Pack and Tylenol with Codeine (please pend it to me), if pt not better after week of steroids I will see pt in person.

## 2024-05-06 NOTE — TELEPHONE ENCOUNTER
Please see below message & advise if you want to see pt for IC f/u appt for low back pain or only if symptoms continue. Ibuprofen & Flexeril Rx given.  LOV 2/1

## 2024-05-09 ENCOUNTER — OFFICE VISIT (OUTPATIENT)
Dept: OCCUPATIONAL MEDICINE | Age: 49
End: 2024-05-09
Attending: PHYSICIAN ASSISTANT

## 2024-05-16 ENCOUNTER — OFFICE VISIT (OUTPATIENT)
Dept: OCCUPATIONAL MEDICINE | Age: 49
End: 2024-05-16
Attending: PHYSICIAN ASSISTANT

## 2024-05-16 DIAGNOSIS — M54.50 LOW BACK PAIN: Primary | ICD-10-CM

## 2024-05-30 ENCOUNTER — LAB ENCOUNTER (OUTPATIENT)
Dept: LAB | Age: 49
End: 2024-05-30
Attending: FAMILY MEDICINE
Payer: COMMERCIAL

## 2024-05-30 DIAGNOSIS — E78.00 PURE HYPERCHOLESTEROLEMIA: ICD-10-CM

## 2024-05-30 LAB
CHOLEST SERPL-MCNC: 235 MG/DL (ref ?–200)
FASTING PATIENT LIPID ANSWER: YES
HDLC SERPL-MCNC: 55 MG/DL (ref 40–59)
LDLC SERPL CALC-MCNC: 130 MG/DL (ref ?–100)
NONHDLC SERPL-MCNC: 180 MG/DL (ref ?–130)
TRIGL SERPL-MCNC: 281 MG/DL (ref 30–149)
VLDLC SERPL CALC-MCNC: 52 MG/DL (ref 0–30)

## 2024-05-30 PROCEDURE — 80061 LIPID PANEL: CPT

## 2024-05-30 PROCEDURE — 36415 COLL VENOUS BLD VENIPUNCTURE: CPT

## 2024-06-03 ENCOUNTER — OFFICE VISIT (OUTPATIENT)
Dept: OCCUPATIONAL MEDICINE | Age: 49
End: 2024-06-03
Attending: FAMILY MEDICINE

## 2024-06-03 DIAGNOSIS — M54.50 ACUTE BILATERAL LOW BACK PAIN WITHOUT SCIATICA: Primary | ICD-10-CM

## 2024-06-03 RX ORDER — CYCLOBENZAPRINE HCL 10 MG
10 TABLET ORAL NIGHTLY PRN
Qty: 20 TABLET | Refills: 0 | Status: SHIPPED | OUTPATIENT
Start: 2024-06-03 | End: 2024-06-23

## 2024-06-05 ENCOUNTER — OFFICE VISIT (OUTPATIENT)
Dept: FAMILY MEDICINE CLINIC | Facility: CLINIC | Age: 49
End: 2024-06-05
Payer: COMMERCIAL

## 2024-06-05 VITALS
HEIGHT: 67 IN | SYSTOLIC BLOOD PRESSURE: 118 MMHG | RESPIRATION RATE: 20 BRPM | DIASTOLIC BLOOD PRESSURE: 78 MMHG | OXYGEN SATURATION: 97 % | HEART RATE: 75 BPM | WEIGHT: 214 LBS | BODY MASS INDEX: 33.59 KG/M2

## 2024-06-05 DIAGNOSIS — E66.9 OBESITY (BMI 30.0-34.9): Primary | ICD-10-CM

## 2024-06-05 PROCEDURE — 99214 OFFICE O/P EST MOD 30 MIN: CPT | Performed by: FAMILY MEDICINE

## 2024-06-05 PROCEDURE — 3074F SYST BP LT 130 MM HG: CPT | Performed by: FAMILY MEDICINE

## 2024-06-05 PROCEDURE — 3078F DIAST BP <80 MM HG: CPT | Performed by: FAMILY MEDICINE

## 2024-06-05 PROCEDURE — 3008F BODY MASS INDEX DOCD: CPT | Performed by: FAMILY MEDICINE

## 2024-06-05 RX ORDER — PHENTERMINE HYDROCHLORIDE 37.5 MG/1
37.5 TABLET ORAL
Qty: 30 TABLET | Refills: 1 | Status: SHIPPED | OUTPATIENT
Start: 2024-06-05

## 2024-06-05 NOTE — PROGRESS NOTES
CC:   Chief Complaint   Patient presents with    Follow - Up     From February issues.    Lab     Follow up.        HPI:  Pt presents for obesity problem.  Pt has it for last year, pt got back injury at work, could not exercise.  Risk factors: sedentary life style.  Makes better: low calories diet and physical activities.  Makes worse: sugar craving and no exercise.  Pt has no following issues:  Cushing disease, thyroid disease,  sleep apnea, DM.  Associate symptoms: excessive weight, inability to loose weights.          Current Outpatient Medications   Medication Sig Dispense Refill    Phentermine HCl 37.5 MG Oral Tab Take 1 tablet (37.5 mg total) by mouth every morning before breakfast. 30 tablet 1    Loratadine (CLARITIN REDITABS OR) Take by mouth daily.      cyclobenzaprine 10 MG Oral Tab Take 1 tablet (10 mg total) by mouth nightly as needed for Muscle spasms. (Patient not taking: Reported on 6/5/2024) 20 tablet 0    methylPREDNISolone (MEDROL) 4 MG Oral Tablet Therapy Pack As directed. (Patient not taking: Reported on 6/5/2024) 1 each 0    acetaminophen-codeine (TYLENOL WITH CODEINE #3) 300-30 MG Oral Tab Take 1 tablet by mouth every 6 (six) hours as needed for Pain. (Patient not taking: Reported on 6/5/2024) 30 tablet 0    IBUPROFEN 800 MG Oral Tab Take 1 tablet (800 mg total) by mouth 3 (three) times daily. 30 tablet 0    CYCLOBENZAPRINE 10 MG Oral Tab Take 1 tablet (10 mg total) by mouth nightly for 20 days. 20 tablet 0    ergocalciferol 1.25 MG (54196 UT) Oral Cap Take 1 capsule (50,000 Units total) by mouth once a week. (Patient not taking: Reported on 6/5/2024) 4 capsule 0      Past Medical History:    Allergic rhinitis    Erectile dysfunction    TMJ (dislocation of temporomandibular joint)    Vitamin D deficiency      Patient Active Problem List   Diagnosis    Unspecified vitamin D deficiency    Screening for thyroid disorder    Screening for iron deficiency anemia    Screening for lipoid  disorders    Routine general medical examination at a health care facility    Obesity (BMI 30.0-34.9)    Acute maxillary sinusitis    Encounter for therapeutic drug monitoring    Inguinal hernia, right    Lateral epicondylitis, left elbow    Sebaceous cyst         REVIEW OF SYSTEMS:   GEN: no fatigue  RESPIRATORY: denies shortness of breath with exertion or rest  CARDIOVASCULAR: denies chest pain on exertion, palpitations.  SKIN: no acne, no skin infections, no fungal infections of the skin.  ENDO: no exercise thirst, polyphagia, no excessive sweating. No sexual dysfunction.  PSYCH: no low self est. No depression.No anxiety    EXAM:   /78   Pulse 75   Resp 20   Ht 5' 7\" (1.702 m)   Wt 214 lb (97.1 kg)   SpO2 97%   BMI 33.52 kg/m²   GENERAL: well developed, well nourished,in no apparent distress  HEENT: atraumatic, normocephalic, throat is clear, PERRL  LUNGS: clear to auscultation  CARDIO: RRR without murmur  VS: no edema of the legs.  GI: good BS's, obese abdomen, no organomegaly.  PSYCH: normal mood, affect.    No orders of the defined types were placed in this encounter.       ASSESSMENT AND PLAN:     Obesity - Discussed BMI target. Discussed calorie counting, diet, exercise, and one pound of weight loss per week by decreasing calorie intake by 500 calories per day.Handout given.  Requested Prescriptions     Signed Prescriptions Disp Refills    Phentermine HCl 37.5 MG Oral Tab 30 tablet 1     Sig: Take 1 tablet (37.5 mg total) by mouth every morning before breakfast.      . All side effects of Phentermine d/w the pt, heart problems possible  in the future, not known effect were explained.Exercise at least 3 times per week, 30min and low calories diet d/w the pt.  30 min cousneling.  Pt understands all the directives.Patient understood above plan and agreed

## 2024-06-07 ENCOUNTER — MED REC SCAN ONLY (OUTPATIENT)
Dept: FAMILY MEDICINE CLINIC | Facility: CLINIC | Age: 49
End: 2024-06-07

## 2024-08-05 ENCOUNTER — OFFICE VISIT (OUTPATIENT)
Dept: FAMILY MEDICINE CLINIC | Facility: CLINIC | Age: 49
End: 2024-08-05
Payer: COMMERCIAL

## 2024-08-05 VITALS
SYSTOLIC BLOOD PRESSURE: 112 MMHG | HEART RATE: 77 BPM | WEIGHT: 205 LBS | BODY MASS INDEX: 32.18 KG/M2 | OXYGEN SATURATION: 97 % | DIASTOLIC BLOOD PRESSURE: 80 MMHG | HEIGHT: 67 IN

## 2024-08-05 DIAGNOSIS — E66.9 OBESITY (BMI 30.0-34.9): Primary | ICD-10-CM

## 2024-08-05 PROCEDURE — 3079F DIAST BP 80-89 MM HG: CPT | Performed by: FAMILY MEDICINE

## 2024-08-05 PROCEDURE — 99214 OFFICE O/P EST MOD 30 MIN: CPT | Performed by: FAMILY MEDICINE

## 2024-08-05 PROCEDURE — 3008F BODY MASS INDEX DOCD: CPT | Performed by: FAMILY MEDICINE

## 2024-08-05 PROCEDURE — 3074F SYST BP LT 130 MM HG: CPT | Performed by: FAMILY MEDICINE

## 2024-08-05 RX ORDER — PHENTERMINE HYDROCHLORIDE 37.5 MG/1
37.5 TABLET ORAL
Qty: 30 TABLET | Refills: 3 | Status: SHIPPED | OUTPATIENT
Start: 2024-08-05

## 2024-08-05 NOTE — PROGRESS NOTES
CC:   Chief Complaint   Patient presents with    Follow - Up    Medication Follow-Up          HPI:  Pt lost  10 lbs. Pt exercises 3 times a week, watches carefully healthy diet.  No chest pains, SOB, no palpitations. Tolerates Phentermine very well.        Current Outpatient Medications   Medication Sig Dispense Refill    Phentermine HCl 37.5 MG Oral Tab Take 1 tablet (37.5 mg total) by mouth every morning before breakfast. 30 tablet 3    Phentermine HCl 37.5 MG Oral Tab Take 1 tablet (37.5 mg total) by mouth every morning before breakfast. 30 tablet 1    Loratadine (CLARITIN REDITABS OR) Take by mouth daily.      cyclobenzaprine 10 MG Oral Tab Take 1 tablet (10 mg total) by mouth nightly as needed for Muscle spasms. (Patient not taking: Reported on 6/5/2024) 20 tablet 0    methylPREDNISolone (MEDROL) 4 MG Oral Tablet Therapy Pack As directed. (Patient not taking: Reported on 6/5/2024) 1 each 0    acetaminophen-codeine (TYLENOL WITH CODEINE #3) 300-30 MG Oral Tab Take 1 tablet by mouth every 6 (six) hours as needed for Pain. (Patient not taking: Reported on 6/5/2024) 30 tablet 0    IBUPROFEN 800 MG Oral Tab Take 1 tablet (800 mg total) by mouth 3 (three) times daily. 30 tablet 0    CYCLOBENZAPRINE 10 MG Oral Tab Take 1 tablet (10 mg total) by mouth nightly for 20 days. 20 tablet 0    ergocalciferol 1.25 MG (74606 UT) Oral Cap Take 1 capsule (50,000 Units total) by mouth once a week. (Patient not taking: Reported on 6/5/2024) 4 capsule 0      Past Medical History:    Allergic rhinitis    Erectile dysfunction    TMJ (dislocation of temporomandibular joint)    Vitamin D deficiency      Patient Active Problem List   Diagnosis    Unspecified vitamin D deficiency    Screening for thyroid disorder    Screening for iron deficiency anemia    Screening for lipoid disorders    Routine general medical examination at a health care facility    Obesity (BMI 30.0-34.9)    Acute maxillary sinusitis    Encounter for therapeutic  drug monitoring    Inguinal hernia, right    Lateral epicondylitis, left elbow    Sebaceous cyst         REVIEW OF SYSTEMS:   RESPIRATORY: denies shortness of breath with exertion  CARDIOVASCULAR: denies chest pain on exertion, palpitations.  SKIN: no acne  PSYCH: no low self est. No depression.    EXAM:   /80   Pulse 77   Ht 5' 7\" (1.702 m)   Wt 205 lb (93 kg)   SpO2 97%   BMI 32.11 kg/m²   GENERAL: well developed, well nourished,in no apparent distress  HEENT: atraumatic, normocephalic, throat is clear, PERRL  LUNGS: clear to auscultation  CARDIO: RRR without murmur  GI: good BS's        ASSESSMENT and PLAN:   Obesity:  The patient indicates understanding of these issues and agrees to the plan. All side effects of Phentermine d/w the pt, heart problems possible  in the future, not known effect were explained.Exercise at least 3 times per week, 30min and low calories diet d/w the pt.35 minutes counseling.  Pt understands all the directives.  Refills:    Rx Phentermine 37.5mg po qd, 30 pills. 3 refills        F/u in 3 months.

## 2024-10-16 DIAGNOSIS — Z12.11 SCREENING FOR COLON CANCER: Primary | ICD-10-CM

## 2024-10-25 ENCOUNTER — HOSPITAL ENCOUNTER (OUTPATIENT)
Age: 49
Discharge: HOME | End: 2024-10-25
Attending: EMERGENCY MEDICINE
Payer: COMMERCIAL

## 2024-10-25 VITALS
BODY MASS INDEX: 31.39 KG/M2 | WEIGHT: 200 LBS | RESPIRATION RATE: 16 BRPM | TEMPERATURE: 98 F | OXYGEN SATURATION: 98 % | SYSTOLIC BLOOD PRESSURE: 135 MMHG | HEIGHT: 67 IN | HEART RATE: 65 BPM | DIASTOLIC BLOOD PRESSURE: 82 MMHG

## 2024-10-25 DIAGNOSIS — S39.012A BACK STRAIN, INITIAL ENCOUNTER: Primary | ICD-10-CM

## 2024-10-25 PROCEDURE — 96372 THER/PROPH/DIAG INJ SC/IM: CPT

## 2024-10-25 PROCEDURE — 99214 OFFICE O/P EST MOD 30 MIN: CPT

## 2024-10-25 RX ORDER — ACETAMINOPHEN 500 MG
500 TABLET ORAL EVERY 6 HOURS PRN
COMMUNITY

## 2024-10-25 RX ORDER — CYCLOBENZAPRINE HCL 10 MG
10 TABLET ORAL NIGHTLY
Qty: 10 TABLET | Refills: 0 | Status: SHIPPED | OUTPATIENT
Start: 2024-10-25 | End: 2024-11-04

## 2024-10-25 RX ORDER — KETOROLAC TROMETHAMINE 30 MG/ML
30 INJECTION, SOLUTION INTRAMUSCULAR; INTRAVENOUS ONCE
Status: COMPLETED | OUTPATIENT
Start: 2024-10-25 | End: 2024-10-25

## 2024-10-31 NOTE — ED PROVIDER NOTES
Patient Seen in: [facility]      History     Chief Complaint   Patient presents with    Back Pain     Stated Complaint: back pain    Subjective:   HPI      48 yo male with pain and stiffness across the low back for several days. No radiation to the legs. No leg weakness or numbness.     Objective:     Past Medical History:    Allergic rhinitis    Erectile dysfunction    TMJ (dislocation of temporomandibular joint)    Vitamin D deficiency              Past Surgical History:   Procedure Laterality Date    Hernia surgery  1/1/99    inguinal.    Laparoscopic repair of initial Right 3/25/2016    Procedure: LAPAROSCOPIC INGUINAL HERNIORRAPHY;  Surgeon: Adan Garcia MD;  [place] [facility] [place] [facility]                Social History     Socioeconomic History    Marital status:    Tobacco Use    Smoking status: Never    Smokeless tobacco: Never   Vaping Use    Vaping status: Never Used   Substance and Sexual Activity    Alcohol use: Yes     Alcohol/week: 1.7 standard drinks of alcohol     Types: 2 Standard drinks or equivalent per week     Comment: very rare     Drug use: Never   Other Topics Concern    Caffeine Concern Yes    Exercise No    Seat Belt Yes              Review of Systems    Positive for stated complaint: back pain  Other systems are as noted in HPI.  Constitutional and vital signs reviewed.      All other systems reviewed and negative except as noted above.    Physical Exam     ED Triage Vitals [10/25/24 1225]   /82   Pulse 65   Resp 16   Temp 98.1 °F (36.7 °C)   Temp src Oral   SpO2 98 %   O2 Device None (Room air)       Current Vitals:   No data recorded    Physical Exam  Vitals and nursing note reviewed.   Constitutional:       Appearance: Normal appearance. He is well-developed.   HENT:      Head: Normocephalic and atraumatic.   Cardiovascular:      Rate and Rhythm: Normal rate and regular rhythm.   Pulmonary:      Effort: Pulmonary effort is normal. No respiratory  distress.   Musculoskeletal:      Comments: Low lumbar tenderness.   Lower extremity strength and sensation normal.    Skin:     General: Skin is warm and dry.      Capillary Refill: Capillary refill takes less than 2 seconds.   Neurological:      General: No focal deficit present.      Mental Status: He is alert.      Sensory: No sensory deficit.   Psychiatric:         Mood and Affect: Mood normal.         Behavior: Behavior normal.            ED Course   Labs Reviewed - No data to display                MDM           Medical Decision Making  Muscle strain, spasm, disc disease all in differential. Exam findings consistent with muscle strain. Discharge on lidocaine patch, first one applied in IC, otc ibuprofen and cyclobenzaprine.     Disposition and Plan     Clinical Impression:  1. Back strain, initial encounter         Disposition:  Discharge  10/25/2024 12:36 pm    Follow-up:  Lesly Holguin MD  [address] [place] [id number]  [phone]      As needed          Medications Prescribed:  Discharge Medication List as of 10/25/2024 12:39 PM              Supplementary Documentation:

## 2024-12-04 ENCOUNTER — OFFICE VISIT (OUTPATIENT)
Dept: FAMILY MEDICINE CLINIC | Facility: CLINIC | Age: 49
End: 2024-12-04
Payer: COMMERCIAL

## 2024-12-04 VITALS
HEIGHT: 67 IN | HEART RATE: 70 BPM | WEIGHT: 202 LBS | SYSTOLIC BLOOD PRESSURE: 124 MMHG | BODY MASS INDEX: 31.71 KG/M2 | OXYGEN SATURATION: 97 % | DIASTOLIC BLOOD PRESSURE: 78 MMHG

## 2024-12-04 DIAGNOSIS — M54.50 CHRONIC BILATERAL LOW BACK PAIN WITHOUT SCIATICA: Primary | ICD-10-CM

## 2024-12-04 DIAGNOSIS — G89.29 CHRONIC BILATERAL LOW BACK PAIN WITHOUT SCIATICA: Primary | ICD-10-CM

## 2024-12-04 DIAGNOSIS — E66.811 OBESITY (BMI 30.0-34.9): ICD-10-CM

## 2024-12-04 PROCEDURE — 3008F BODY MASS INDEX DOCD: CPT | Performed by: FAMILY MEDICINE

## 2024-12-04 PROCEDURE — 3078F DIAST BP <80 MM HG: CPT | Performed by: FAMILY MEDICINE

## 2024-12-04 PROCEDURE — 3074F SYST BP LT 130 MM HG: CPT | Performed by: FAMILY MEDICINE

## 2024-12-04 PROCEDURE — 99214 OFFICE O/P EST MOD 30 MIN: CPT | Performed by: FAMILY MEDICINE

## 2024-12-05 RX ORDER — PHENTERMINE HYDROCHLORIDE 37.5 MG/1
37.5 TABLET ORAL
Qty: 30 TABLET | Refills: 3 | Status: SHIPPED | OUTPATIENT
Start: 2024-12-05

## 2024-12-05 RX ORDER — CYCLOBENZAPRINE HCL 10 MG
10 TABLET ORAL NIGHTLY
Qty: 30 TABLET | Refills: 1 | Status: SHIPPED | OUTPATIENT
Start: 2024-12-05

## 2024-12-05 NOTE — PROGRESS NOTES
CC:   Chief Complaint   Patient presents with    Follow - Up        HPI:  Pt presents for obesity problem.  Pt has it for last year, pt got back injury at work, could not exercise.  Risk factors: sedentary life style.  Makes better: low calories diet and physical activities.  Makes worse: sugar craving and no exercise.  Pt has no following issues:  Cushing disease, thyroid disease,  sleep apnea, DM.  Associate symptoms: excessive weight, inability to loose weights.    Lower back pain, doing better, Flexeril helps a lot, pt takes it as needed.      Current Outpatient Medications   Medication Sig Dispense Refill    Phentermine HCl 37.5 MG Oral Tab Take 1 tablet (37.5 mg total) by mouth every morning before breakfast. 30 tablet 3    cyclobenzaprine 10 MG Oral Tab Take 1 tablet (10 mg total) by mouth nightly. 30 tablet 1    acetaminophen 500 MG Oral Tab Take 1 tablet (500 mg total) by mouth every 6 (six) hours as needed for Pain.      Loratadine (CLARITIN REDITABS OR) Take by mouth daily.      cyclobenzaprine 10 MG Oral Tab Take 1 tablet (10 mg total) by mouth nightly for 10 days. 10 tablet 0    methylPREDNISolone (MEDROL) 4 MG Oral Tablet Therapy Pack As directed. (Patient not taking: Reported on 6/5/2024) 1 each 0    acetaminophen-codeine (TYLENOL WITH CODEINE #3) 300-30 MG Oral Tab Take 1 tablet by mouth every 6 (six) hours as needed for Pain. (Patient not taking: Reported on 6/5/2024) 30 tablet 0    IBUPROFEN 800 MG Oral Tab Take 1 tablet (800 mg total) by mouth 3 (three) times daily. 30 tablet 0    ergocalciferol 1.25 MG (25171 UT) Oral Cap Take 1 capsule (50,000 Units total) by mouth once a week. (Patient not taking: Reported on 6/5/2024) 4 capsule 0      Past Medical History:    Allergic rhinitis    Erectile dysfunction    TMJ (dislocation of temporomandibular joint)    Vitamin D deficiency      Patient Active Problem List   Diagnosis    Unspecified vitamin D deficiency    Screening for thyroid disorder     Screening for iron deficiency anemia    Screening for lipoid disorders    Routine general medical examination at a health care facility    Obesity (BMI 30.0-34.9)    Acute maxillary sinusitis    Encounter for therapeutic drug monitoring    Inguinal hernia, right    Lateral epicondylitis, left elbow    Sebaceous cyst         REVIEW OF SYSTEMS:   GEN: no fatigue  RESPIRATORY: denies shortness of breath with exertion or rest  CARDIOVASCULAR: denies chest pain on exertion, palpitations.  SKIN: no acne, no skin infections, no fungal infections of the skin.  ENDO: no exercise thirst, polyphagia, no excessive sweating. No sexual dysfunction.  PSYCH: no low self est. No depression.No anxiety    EXAM:   /78   Pulse 70   Ht 5' 7\" (1.702 m)   Wt 202 lb (91.6 kg)   SpO2 97%   BMI 31.64 kg/m²   GENERAL: well developed, well nourished,in no apparent distress  HEENT: atraumatic, normocephalic, throat is clear, PERRL  LUNGS: clear to auscultation  CARDIO: RRR without murmur  VS: no edema of the legs.  GI: good BS's, obese abdomen, no organomegaly.  PSYCH: normal mood, affect.    No orders of the defined types were placed in this encounter.       ASSESSMENT AND PLAN:     Jayro was seen today for follow - up.    Diagnoses and all orders for this visit:    Chronic bilateral low back pain without sciatica  -     cyclobenzaprine 10 MG Oral Tab; Take 1 tablet (10 mg total) by mouth nightly.    Obesity (BMI 30.0-34.9)  -     Phentermine HCl 37.5 MG Oral Tab; Take 1 tablet (37.5 mg total) by mouth every morning before breakfast.       Discussed BMI target. Discussed calorie counting, diet, exercise, and one pound of weight loss per week by decreasing calorie intake by 500 calories per day.Handout given.  . All side effects of Phentermine d/w the pt, heart problems possible  in the future, not known effect were explained.Exercise at least 3 times per week, 30min and low calories diet d/w the pt.Handouts given.total time with pt:  30 min  Pt understands all the directives.Patient understood above plan and agreed  F/u in 3 months for physical.

## 2025-01-23 ENCOUNTER — OFFICE VISIT (OUTPATIENT)
Dept: FAMILY MEDICINE CLINIC | Facility: CLINIC | Age: 50
End: 2025-01-23
Payer: COMMERCIAL

## 2025-01-23 VITALS
OXYGEN SATURATION: 98 % | BODY MASS INDEX: 31.86 KG/M2 | HEART RATE: 72 BPM | RESPIRATION RATE: 16 BRPM | DIASTOLIC BLOOD PRESSURE: 80 MMHG | WEIGHT: 203 LBS | SYSTOLIC BLOOD PRESSURE: 110 MMHG | HEIGHT: 67 IN

## 2025-01-23 DIAGNOSIS — K02.9 TOOTH CARIES: Primary | ICD-10-CM

## 2025-01-23 PROCEDURE — 99214 OFFICE O/P EST MOD 30 MIN: CPT | Performed by: NURSE PRACTITIONER

## 2025-01-23 PROCEDURE — 3079F DIAST BP 80-89 MM HG: CPT | Performed by: NURSE PRACTITIONER

## 2025-01-23 PROCEDURE — 3008F BODY MASS INDEX DOCD: CPT | Performed by: NURSE PRACTITIONER

## 2025-01-23 PROCEDURE — 3074F SYST BP LT 130 MM HG: CPT | Performed by: NURSE PRACTITIONER

## 2025-01-23 PROCEDURE — 96372 THER/PROPH/DIAG INJ SC/IM: CPT | Performed by: NURSE PRACTITIONER

## 2025-01-23 RX ORDER — PENICILLIN V POTASSIUM 500 MG/1
500 TABLET, FILM COATED ORAL 3 TIMES DAILY
COMMUNITY
Start: 2025-01-21 | End: 2025-01-23

## 2025-01-23 RX ORDER — DOXYCYCLINE 100 MG/1
100 CAPSULE ORAL 2 TIMES DAILY
Qty: 20 CAPSULE | Refills: 0 | Status: SHIPPED | OUTPATIENT
Start: 2025-01-23 | End: 2025-02-02

## 2025-01-23 RX ORDER — KETOROLAC TROMETHAMINE 30 MG/ML
60 INJECTION, SOLUTION INTRAMUSCULAR; INTRAVENOUS ONCE
Status: COMPLETED | OUTPATIENT
Start: 2025-01-23 | End: 2025-01-23

## 2025-01-23 RX ADMIN — KETOROLAC TROMETHAMINE 60 MG: 30 INJECTION, SOLUTION INTRAMUSCULAR; INTRAVENOUS at 10:11:00

## 2025-01-23 NOTE — PROGRESS NOTES
[facility]   PROGRESS NOTE  Chief Complaint:   Chief Complaint   Patient presents with    Dental Problem       HPI:   This is a 49 year old male coming in for tooth ache   Dentist appointment  one week ago , prescribed  penicillin reports its not working - getting better , still in pain reports taking tylenol #3 with partial relief. Denies any fever or chills       Results for orders placed or performed in visit on 05/30/24   Lipid Panel    Collection Time: 05/30/24 10:06 AM   Result Value Ref Range    Cholesterol, Total 235 (H) <200 mg/dL    HDL Cholesterol 55 40 - 59 mg/dL    Triglycerides 281 (H) 30 - 149 mg/dL    LDL Cholesterol 130 (H) <100 mg/dL    VLDL 52 (H) 0 - 30 mg/dL    Non HDL Chol 180 (H) <130 mg/dL    Patient Fasting for Lipid? Yes        Past Medical History:    Allergic rhinitis    Erectile dysfunction    TMJ (dislocation of temporomandibular joint)    Vitamin D deficiency     Past Surgical History:   Procedure Laterality Date    Hernia surgery  1/1/99    inguinal.    Laparoscopic repair of initial Right 3/25/2016    Procedure: LAPAROSCOPIC INGUINAL HERNIORRAPHY;  Surgeon: Adan Garcia MD;  [facility]: [facility]     Social History:  Social History     Socioeconomic History    Marital status:    Tobacco Use    Smoking status: Never    Smokeless tobacco: Never   Vaping Use    Vaping status: Never Used   Substance and Sexual Activity    Alcohol use: Yes     Alcohol/week: 1.7 standard drinks of alcohol     Types: 2 Standard drinks or equivalent per week     Comment: very rare     Drug use: Never   Other Topics Concern    Caffeine Concern Yes    Exercise No    Seat Belt Yes     Family History:  Family History   Problem Relation Age of Onset    Heart Disorder Father     Cancer Other         family hx    Other (CAD) Maternal Grandfather     Other (CAD) Paternal Grandfather     Diabetes Maternal Grandmother      Allergies:  Allergies[1]  Current Meds:  Current Outpatient  Medications   Medication Sig Dispense Refill    penicillin v potassium 500 MG Oral Tab Take 1 tablet (500 mg total) by mouth 3 (three) times daily.      Phentermine HCl 37.5 MG Oral Tab Take 1 tablet (37.5 mg total) by mouth every morning before breakfast. 30 tablet 3    cyclobenzaprine 10 MG Oral Tab Take 1 tablet (10 mg total) by mouth nightly. 30 tablet 1    acetaminophen 500 MG Oral Tab Take 1 tablet (500 mg total) by mouth every 6 (six) hours as needed for Pain.      acetaminophen-codeine (TYLENOL WITH CODEINE #3) 300-30 MG Oral Tab Take 1 tablet by mouth every 6 (six) hours as needed for Pain. 30 tablet 0    Loratadine (CLARITIN REDITABS OR) Take by mouth daily.      cyclobenzaprine 10 MG Oral Tab Take 1 tablet (10 mg total) by mouth nightly for 10 days. 10 tablet 0    IBUPROFEN 800 MG Oral Tab Take 1 tablet (800 mg total) by mouth 3 (three) times daily. 30 tablet 0      Counseling given: Not Answered       REVIEW OF SYSTEMS:   CONSTITUTIONAL:  Denies unusual weight gain/loss, fever, chills, or fatigue.  EENT:  Eyes:  Denies eye pain, visual loss, blurred vision, double vision or yellow sclerae. Ears, Nose, Throat:  Denies hearing loss, sneezing, congestion, runny nose or sore throat.  INTEGUMENTARY:  Denies rashes, itching, skin lesion, or excessive skin dryness.  CARDIOVASCULAR:  Denies chest pain, chest pressure, chest discomfort, palpitations, edema, dyspnea on exertion or at rest.  RESPIRATORY:  Denies shortness of breath, wheezing, cough or sputum.  GASTROINTESTINAL:  Denies abdominal pain, nausea, vomiting, constipation, diarrhea, or blood in stool.  MUSCULOSKELETAL:  Denies weakness, muscle aches, back pain, joint pain, swelling or stiffness.  NEUROLOGICAL:  Denies headache, seizures, dizziness, syncope, paralysis, ataxia, numbness or tingling in the extremities,change in bowel or bladder control.  HEMATOLOGIC:  Denies anemia, bleeding or bruising.  LYMPHATICS:  Denies enlarged nodes or history of  splenectomy.  PSYCHIATRIC:  Denies depression or anxiety.  ENDOCRINOLOGIC:  Denies excessive sweating, cold or heat intolerance, polyuria or polydipsia.  ALLERGIES:  Denies allergic response, history of asthma, sneezing, hives, eczema or rhinitis.     EXAM:   /80   Pulse 72   Resp 16   Ht 5' 7\" (1.702 m)   Wt 203 lb (92.1 kg)   SpO2 98%   BMI 31.79 kg/m²  Estimated body mass index is 31.79 kg/m² as calculated from the following:    Height as of this encounter: 5' 7\" (1.702 m).    Weight as of this encounter: 203 lb (92.1 kg).   Vital signs reviewed.Appears stated age, well groomed.  Physical Exam:  GEN:  Patient is alert, awake and oriented, well developed, no apparent distress.  HEENT:  Head:  Normocephalic, atraumatic Eyes: EOMI, PERRLA, no scleral icterus, conjunctivae clear bilaterally, no eye discharge Ears: External normal. Nose: patent, no nasal discharge Throat:  No tonsillar erythema or exudate.  Mouth:  No oral lesions or ulcerations, good dentition.  NECK: Supple, no CLAD, no JVD, no thyromegaly.  SKIN: No rashes, no skin lesion, no bruising, good turgor.  HEART:  Regular rate and rhythm, no murmurs, rubs or gallops.  LUNGS: Clear to auscultation bilterally, no rales/rhonchi/wheezing.  CHEST: No tenderness.  ABDOMEN:  Soft, nondistended, nontender, bowel sounds normal in all 4 quadrants, no masses, no hepatosplenomegaly.  BACK: No tenderness, no spasm, SLR test negative, FROM.  EXTREMITIES:  No edema, no cyanosis, no clubbing, FROM, 2+ dorsalis pedis pulses bilaterally.  NEURO:  No deficit, normal gait, strength and tone, sensory intact, normal reflexes.    ASSESSMENT AND PLAN:   Diagnoses and all orders for this visit:    Tooth caries  -     doxycycline 100 MG Oral Cap; Take 1 capsule (100 mg total) by mouth 2 (two) times daily for 10 days.  -     ketorolac (Toradol) 60 MG/2ML IM injection 60 mg         Meds & Refills for this Visit:        Health Maintenance:  Health Maintenance Due   Topic  Date Due    COVID-19 Vaccine (4 - 2024-25 season) 09/01/2024    Influenza Vaccine (1) 10/01/2024    Annual Depression Screening  01/01/2025    Colorectal Cancer Screening  01/04/2025    Annual Physical  02/01/2025       Patient/Caregiver Education: Patient/Caregiver Education: There are no barriers to learning. Medical education done.   Outcome: Patient verbalizes understanding. Patient is notified to call with any questions, complications, allergies, or worsening or changing symptoms.  Patient is to call with any side effects or complications from the treatments as a result of today.     Problem List:  Patient Active Problem List   Diagnosis    Unspecified vitamin D deficiency    Screening for thyroid disorder    Screening for iron deficiency anemia    Screening for lipoid disorders    Routine general medical examination at a health care facility    Obesity (BMI 30.0-34.9)    Acute maxillary sinusitis    Encounter for therapeutic drug monitoring    Inguinal hernia, right    Lateral epicondylitis, left elbow    Sebaceous cyst       Ausra NIKOLAS Zee  1/23/2025  9:55 AM         [1]   Allergies  Allergen Reactions    Amoxicillin      \"doesnt work\"

## 2025-02-17 PROCEDURE — 82274 ASSAY TEST FOR BLOOD FECAL: CPT | Performed by: FAMILY MEDICINE

## 2025-02-25 LAB — HEMOCCULT STL QL: NEGATIVE

## 2025-02-26 ENCOUNTER — LAB ENCOUNTER (OUTPATIENT)
Dept: LAB | Age: 50
End: 2025-02-26
Attending: FAMILY MEDICINE
Payer: COMMERCIAL

## 2025-02-26 ENCOUNTER — OFFICE VISIT (OUTPATIENT)
Dept: FAMILY MEDICINE CLINIC | Facility: CLINIC | Age: 50
End: 2025-02-26
Payer: COMMERCIAL

## 2025-02-26 VITALS
BODY MASS INDEX: 31.71 KG/M2 | WEIGHT: 202 LBS | SYSTOLIC BLOOD PRESSURE: 114 MMHG | DIASTOLIC BLOOD PRESSURE: 78 MMHG | HEART RATE: 76 BPM | OXYGEN SATURATION: 97 % | HEIGHT: 67 IN

## 2025-02-26 DIAGNOSIS — Z12.5 ENCOUNTER FOR PROSTATE CANCER SCREENING: ICD-10-CM

## 2025-02-26 DIAGNOSIS — Z00.00 ROUTINE GENERAL MEDICAL EXAMINATION AT A HEALTH CARE FACILITY: ICD-10-CM

## 2025-02-26 DIAGNOSIS — Z00.00 ROUTINE GENERAL MEDICAL EXAMINATION AT A HEALTH CARE FACILITY: Primary | ICD-10-CM

## 2025-02-26 DIAGNOSIS — D22.9 ATYPICAL MOLE: ICD-10-CM

## 2025-02-26 DIAGNOSIS — M54.50 CHRONIC BILATERAL LOW BACK PAIN WITHOUT SCIATICA: ICD-10-CM

## 2025-02-26 DIAGNOSIS — M54.32 BILATERAL SCIATICA: ICD-10-CM

## 2025-02-26 DIAGNOSIS — M54.31 BILATERAL SCIATICA: ICD-10-CM

## 2025-02-26 DIAGNOSIS — E66.811 OBESITY (BMI 30.0-34.9): ICD-10-CM

## 2025-02-26 DIAGNOSIS — G89.29 CHRONIC BILATERAL LOW BACK PAIN WITHOUT SCIATICA: ICD-10-CM

## 2025-02-26 PROBLEM — L72.3 SEBACEOUS CYST: Status: RESOLVED | Noted: 2024-02-29 | Resolved: 2025-02-26

## 2025-02-26 LAB
ALBUMIN SERPL-MCNC: 4.3 G/DL (ref 3.2–4.8)
ALBUMIN/GLOB SERPL: 1.8 {RATIO} (ref 1–2)
ALP LIVER SERPL-CCNC: 65 U/L
ALT SERPL-CCNC: 29 U/L
ANION GAP SERPL CALC-SCNC: 7 MMOL/L (ref 0–18)
AST SERPL-CCNC: 20 U/L (ref ?–34)
BASOPHILS # BLD AUTO: 0.06 X10(3) UL (ref 0–0.2)
BASOPHILS NFR BLD AUTO: 1 %
BILIRUB SERPL-MCNC: 0.5 MG/DL (ref 0.3–1.2)
BUN BLD-MCNC: 13 MG/DL (ref 9–23)
CALCIUM BLD-MCNC: 9.5 MG/DL (ref 8.7–10.6)
CHLORIDE SERPL-SCNC: 105 MMOL/L (ref 98–112)
CHOLEST SERPL-MCNC: 198 MG/DL (ref ?–200)
CO2 SERPL-SCNC: 27 MMOL/L (ref 21–32)
COMPLEXED PSA SERPL-MCNC: 0.71 NG/ML (ref ?–4)
CREAT BLD-MCNC: 0.92 MG/DL
EGFRCR SERPLBLD CKD-EPI 2021: 102 ML/MIN/1.73M2 (ref 60–?)
EOSINOPHIL # BLD AUTO: 0.08 X10(3) UL (ref 0–0.7)
EOSINOPHIL NFR BLD AUTO: 1.3 %
ERYTHROCYTE [DISTWIDTH] IN BLOOD BY AUTOMATED COUNT: 13.2 %
FASTING PATIENT LIPID ANSWER: NO
FASTING STATUS PATIENT QL REPORTED: NO
GLOBULIN PLAS-MCNC: 2.4 G/DL (ref 2–3.5)
GLUCOSE BLD-MCNC: 95 MG/DL (ref 70–99)
HCT VFR BLD AUTO: 47.5 %
HDLC SERPL-MCNC: 50 MG/DL (ref 40–59)
HGB BLD-MCNC: 16 G/DL
IMM GRANULOCYTES # BLD AUTO: 0.08 X10(3) UL (ref 0–1)
IMM GRANULOCYTES NFR BLD: 1.3 %
LDLC SERPL CALC-MCNC: 122 MG/DL (ref ?–100)
LYMPHOCYTES # BLD AUTO: 1.63 X10(3) UL (ref 1–4)
LYMPHOCYTES NFR BLD AUTO: 26.2 %
MCH RBC QN AUTO: 30.4 PG (ref 26–34)
MCHC RBC AUTO-ENTMCNC: 33.7 G/DL (ref 31–37)
MCV RBC AUTO: 90.3 FL
MONOCYTES # BLD AUTO: 0.59 X10(3) UL (ref 0.1–1)
MONOCYTES NFR BLD AUTO: 9.5 %
NEUTROPHILS # BLD AUTO: 3.79 X10 (3) UL (ref 1.5–7.7)
NEUTROPHILS # BLD AUTO: 3.79 X10(3) UL (ref 1.5–7.7)
NEUTROPHILS NFR BLD AUTO: 60.7 %
NONHDLC SERPL-MCNC: 148 MG/DL (ref ?–130)
OSMOLALITY SERPL CALC.SUM OF ELEC: 288 MOSM/KG (ref 275–295)
PLATELET # BLD AUTO: 270 10(3)UL (ref 150–450)
POTASSIUM SERPL-SCNC: 4.2 MMOL/L (ref 3.5–5.1)
PROT SERPL-MCNC: 6.7 G/DL (ref 5.7–8.2)
RBC # BLD AUTO: 5.26 X10(6)UL
SODIUM SERPL-SCNC: 139 MMOL/L (ref 136–145)
TRIGL SERPL-MCNC: 146 MG/DL (ref 30–149)
TSI SER-ACNC: 2.59 UIU/ML (ref 0.55–4.78)
VIT D+METAB SERPL-MCNC: 18.7 NG/ML (ref 30–100)
VLDLC SERPL CALC-MCNC: 26 MG/DL (ref 0–30)
WBC # BLD AUTO: 6.2 X10(3) UL (ref 4–11)

## 2025-02-26 PROCEDURE — 84443 ASSAY THYROID STIM HORMONE: CPT

## 2025-02-26 PROCEDURE — 80061 LIPID PANEL: CPT

## 2025-02-26 PROCEDURE — 85025 COMPLETE CBC W/AUTO DIFF WBC: CPT

## 2025-02-26 PROCEDURE — 36415 COLL VENOUS BLD VENIPUNCTURE: CPT

## 2025-02-26 PROCEDURE — 82306 VITAMIN D 25 HYDROXY: CPT

## 2025-02-26 PROCEDURE — 80053 COMPREHEN METABOLIC PANEL: CPT

## 2025-02-26 RX ORDER — CYCLOBENZAPRINE HCL 10 MG
10 TABLET ORAL NIGHTLY
Qty: 30 TABLET | Refills: 5 | Status: SHIPPED | OUTPATIENT
Start: 2025-02-26

## 2025-02-26 RX ORDER — PHENTERMINE HYDROCHLORIDE 37.5 MG/1
37.5 TABLET ORAL
Qty: 30 TABLET | Refills: 5 | Status: SHIPPED | OUTPATIENT
Start: 2025-02-26

## 2025-02-26 NOTE — PATIENT INSTRUCTIONS
Dr. Arely Headley DDS.   Address: [address], [place] [address]   Phone: [phone]

## 2025-02-26 NOTE — PROGRESS NOTES
Jayro Franks is a 50 year old male who presents for a complete physical exam.   HPI:        Chief Complaint   Patient presents with    Well Adult     Reviewed Preventative/Wellness form with patient.        Patient is present for complete physical. Feels very well. Up to date with dental visits.No hearing problems. Vaccinations: up to date.        Wt Readings from Last 3 Encounters:   02/26/25 202 lb (91.6 kg)   01/23/25 203 lb (92.1 kg)   12/04/24 202 lb (91.6 kg)      BP Readings from Last 3 Encounters:   02/26/25 114/78   01/23/25 110/80   12/04/24 124/78       Cholesterol, Total (mg/dL)   Date Value   05/30/2024 235 (H)   01/18/2024 213 (H)   02/06/2023 209 (H)     CHOLESTEROL, TOTAL (mg/dL)   Date Value   01/06/2012 217 (H)     CHOLESTEROL (mg/dL)   Date Value   08/08/2014 245 (H)     HDL Cholesterol (mg/dL)   Date Value   05/30/2024 55   01/18/2024 52   02/06/2023 55     HDL CHOLESTEROL (mg/dL)   Date Value   01/06/2012 56     HDL CHOL (mg/dL)   Date Value   08/08/2014 56     LDL Cholesterol (mg/dL)   Date Value   05/30/2024 130 (H)   01/18/2024 144 (H)   02/06/2023 133 (H)     LDL-CHOLESTEROL (mg/dL (calc))   Date Value   01/06/2012 130 (H)     LDL CHOLESTROL (mg/dL)   Date Value   08/08/2014 164 (H)     AST (U/L)   Date Value   01/18/2024 16   02/06/2023 13 (L)   10/08/2019 12 (L)   08/08/2014 14 (L)   01/06/2012 16     ALT (U/L)   Date Value   01/18/2024 33   02/06/2023 34   10/08/2019 28   08/08/2014 32   01/06/2012 26      Current Outpatient Medications   Medication Sig Dispense Refill    Phentermine HCl 37.5 MG Oral Tab Take 1 tablet (37.5 mg total) by mouth every morning before breakfast. 30 tablet 5    cyclobenzaprine 10 MG Oral Tab Take 1 tablet (10 mg total) by mouth nightly. 30 tablet 5    acetaminophen 500 MG Oral Tab Take 1 tablet (500 mg total) by mouth every 6 (six) hours as needed for Pain.      acetaminophen-codeine (TYLENOL WITH CODEINE #3) 300-30 MG Oral Tab Take 1 tablet by mouth  every 6 (six) hours as needed for Pain. 30 tablet 0    Loratadine (CLARITIN REDITABS OR) Take by mouth daily.      IBUPROFEN 800 MG Oral Tab Take 1 tablet (800 mg total) by mouth 3 (three) times daily. 30 tablet 0      Past Medical History:    Allergic rhinitis    Erectile dysfunction    TMJ (dislocation of temporomandibular joint)    Vitamin D deficiency      Past Surgical History:   Procedure Laterality Date    Hernia surgery  1/1/99    inguinal.    Laparoscopic repair of initial Right 3/25/2016    Procedure: LAPAROSCOPIC INGUINAL HERNIORRAPHY;  Surgeon: Adan Garcia MD;  [facility] [place] [facility]      Family History   Problem Relation Age of Onset    Heart Disorder Father     Cancer Other         family hx    Other (CAD) Maternal Grandfather     Other (CAD) Paternal Grandfather     Diabetes Maternal Grandmother       Social History     Socioeconomic History    Marital status:    Tobacco Use    Smoking status: Never    Smokeless tobacco: Never   Vaping Use    Vaping status: Never Used   Substance and Sexual Activity    Alcohol use: Yes     Alcohol/week: 1.7 standard drinks of alcohol     Types: 2 Standard drinks or equivalent per week     Comment: very rare     Drug use: Never   Other Topics Concern    Caffeine Concern Yes    Exercise No    Seat Belt Yes     Exercise: healthclub, hockey  .  Diet: watches calories closely     REVIEW OF SYSTEMS:   GENERAL HEALTH: feels well, no fatigue.  SKIN: denies any unusual skin lesions or rashes  EYES: no visual complaints or deficits  HEENT: denies nasal congestion, sinus pain or sore throat; hearing loss negative.  RESPIRATORY: denies shortness of breath, wheezing or cough   CARDIOVASCULAR: denies chest pain, SOB on exertion,no orthopnea, no edema, no palpitations   GI: denies nausea, vomiting, constipation, diarrhea; no rectal bleeding; no heartburn  GENITAL/: no dysuria, urgency or frequency; no epididymal or testicular pain; no penile discharge; no  hernias; no erectile dysfunction; no nocturia  MUSCULOSKELETAL: no joint complaints upper or lower extremities  NEURO: no sensory or motor complaint  PSYCH: no symptoms of depression or anxiety, no insomnia.  HEMATOLOGY: denies hx anemia; denies bruising or excessive bleeding  ENDOCRINE: denies excessive thirst or urination; denies unexpected wt gain or wt loss  ALLERGY/IMM.: denies food or seasonal allergies  PSYCH: no anxiety, depression, mood issues.    EXAM:   /78   Pulse 76   Ht 5' 7\" (1.702 m)   Wt 202 lb (91.6 kg)   SpO2 97%   BMI 31.64 kg/m²     General: WD/WN in no acute distress.   HEENT: PERRLA and EOMI.  OP moist no lesions. TM normal, canals normal.  NECK: is supple,thyroid- normal. No carotid bruits.    Heart: is RRR.  S1, S2, with no murmurs.    Lungs: are clear to auscultation bilaterally, with no wheeze, rhonchi, or rales.  Heart: is RRR.  S1, S2, with no murmurs.    Abdomen: is soft, NT/ND with no HSM.  No rebound or guarding, NABS.     exam: testes bilaterally descended without masses.  No urethral D/C, or skin lesions.  No inguinal hernias.   Rectal exam: has normal tone, no masses.  Prostate is normal, non-tender without nodules.  Extremities: are symmetric with no cyanosis, clubbing, or edema.    Skin: is unremarkable without rashes.    Neuro: no focal abnormalities, and reflexes coordination and gait normal and symmetric.   MUSK/SKEL: Normal muscles tones, no joint abnormalities, full ROM of all extremities.    back- normal curves, no scoliosis, normal gait,  No joint or mulses abnormalities.  Psych: pt is alert, oriented x 3, normal affect.      ASSESSMENT AND PLAN:     Jayro Franks is a 50 year old male who presents for a complete physical exam.   Pt's weight is Body mass index is 31.64 kg/m²., recommended low fat diet and aerobic exercise 30 minutes three times weekly.       Routine general medical examination at a health care facility  -     CBC With Differential  With Platelet; Future  -     Comp Metabolic Panel (14); Future  -     Lipid Panel; Future  -     Vitamin D; Future  -     TSH W Reflex To Free T4; Future    Encounter for prostate cancer screening  -     PSA Total, Screen; Future    Obesity (BMI 30.0-34.9)  -     Phentermine HCl 37.5 MG Oral Tab; Take 1 tablet (37.5 mg total) by mouth every morning before breakfast.    Bilateral sciatica  Chronic bilateral low back pain without sciatica  -     cyclobenzaprine 10 MG Oral Tab; Take 1 tablet (10 mg total) by mouth nightly.     H/o atypical moles: referral to derm.  The patient indicates understanding of these issues and agrees to the plan.  The patient is asked to return in 6 months.

## 2025-03-03 RX ORDER — ERGOCALCIFEROL 1.25 MG/1
50000 CAPSULE, LIQUID FILLED ORAL WEEKLY
Qty: 4 CAPSULE | Refills: 4 | Status: SHIPPED | OUTPATIENT
Start: 2025-03-03 | End: 2025-03-25

## 2025-05-10 ENCOUNTER — HOSPITAL ENCOUNTER (EMERGENCY)
Age: 50
Discharge: HOME | End: 2025-05-10
Payer: COMMERCIAL

## 2025-05-10 VITALS
DIASTOLIC BLOOD PRESSURE: 100 MMHG | WEIGHT: 203 LBS | SYSTOLIC BLOOD PRESSURE: 136 MMHG | BODY MASS INDEX: 32 KG/M2 | RESPIRATION RATE: 22 BRPM | OXYGEN SATURATION: 100 % | TEMPERATURE: 98 F | HEART RATE: 86 BPM

## 2025-05-10 DIAGNOSIS — S39.012A STRAIN OF LUMBAR REGION, INITIAL ENCOUNTER: Primary | ICD-10-CM

## 2025-05-10 PROCEDURE — 99283 EMERGENCY DEPT VISIT LOW MDM: CPT

## 2025-05-10 PROCEDURE — 96372 THER/PROPH/DIAG INJ SC/IM: CPT

## 2025-05-10 RX ORDER — LIDOCAINE 4 G/G
1 PATCH TOPICAL EVERY 24 HOURS
Qty: 10 PATCH | Refills: 0 | Status: SHIPPED | OUTPATIENT
Start: 2025-05-10

## 2025-05-10 RX ORDER — KETOROLAC TROMETHAMINE 10 MG/1
10 TABLET, FILM COATED ORAL 3 TIMES DAILY
Qty: 15 TABLET | Refills: 0 | Status: SHIPPED | OUTPATIENT
Start: 2025-05-10 | End: 2025-05-15

## 2025-05-10 RX ORDER — KETOROLAC TROMETHAMINE 30 MG/ML
60 INJECTION, SOLUTION INTRAMUSCULAR; INTRAVENOUS ONCE
Status: COMPLETED | OUTPATIENT
Start: 2025-05-10 | End: 2025-05-10

## 2025-05-10 NOTE — ED INITIAL ASSESSMENT (HPI)
Pt to ed from home with c/o low back pain, pain and stiffness to back and tailbone, pt has no injury or trauma, hx of present sx.

## 2025-05-10 NOTE — DISCHARGE INSTRUCTIONS
Toradol 3 times daily with meals.  Do not take additional ibuprofen.  1 lidocaine patch for 24 hours.  Flexeril at night.  Alternate ice and heat.  Follow-up with the provided specialist for failure to improve

## 2025-05-10 NOTE — ED PROVIDER NOTES
Patient Seen in: [facility] [place]      History     Chief Complaint   Patient presents with    Low Back Pain     Stated Complaint: lower back strain    Subjective:   HPI    50-year-old male.  The patient arrives to the ER for evaluation of left lower back pain.   2 days prior to arrival the patient was cleaning out his garage and building a pool for his kids when the back pain started.  Progressively worsening.  Occasional spasm.  Generalized stiffness.  Particularly painful when transitioning from seated to standing.  Does radiate into the left buttock.  He does have a history of remote injury that occurred 1.5 years prior to arrival.  Did see a specialist and underwent physical therapy.  Will occasionally have similar flareups.  Last night took a Flexeril which did help him sleep.  No other medications have been taken.  Otherwise feels well.  No fever or chills.  No urinary complaints.  No changes to bowel habits      History of Present Illness               Objective:     No pertinent past medical history.            No pertinent past surgical history.              No pertinent social history.                              Physical Exam     ED Triage Vitals [05/10/25 1629]   BP (!) 136/100   Pulse 86   Resp 22   Temp 97.8 °F (36.6 °C)   Temp src Temporal   SpO2 100 %   O2 Device        Current Vitals:   Vital Signs  BP: (!) 136/100  Pulse: 86  Resp: 22  Temp: 97.8 °F (36.6 °C)  Temp src: Temporal    Oxygen Therapy  SpO2: 100 %        Physical Exam     Physical Exam         Gen: Well appearing, well groomed, alert and aware x 3  Neck: Supple, full range of motion, no thyromegaly or lymphadenopathy.  Lung: No distress, RR, no retraction  Extremities: Full ROM, strength and sensation are equal.  Two-point discrimination is intact.  DTRs are appropriate and equal.  Abdominal: Soft exam.  No distention.  No pain to palpation all 4 quadrants.  No evidence of hernia to the inguinal region  Back:   has generalized left paralumbar muscular tenderness and is point tender at the left SI joint.  No piriformis tenderness. no lumbar or thoracic point tenderness   Skin: No sign of trauma, Skin warm and dry, no induration or sign of infection.   Neuro: Cranial nerves intact, Normal Gait.      ED Course   Labs Reviewed - No data to display       Results                           MDM        Patient received a Toradol injection.  He has generalized left paralumbar muscular tenderness and is point tender at the left SI joint.  He is ambulating without difficulty.  He denies any foot drop.  No incontinence.  No weakness.  Afebrile.    We will continue Toradol for the next 5 days.  He has Flexeril at home that he will use at night.  Lidocaine patches provided.      Medical Decision Making      Disposition and Plan     Clinical Impression:  1. Strain of lumbar region, initial encounter         Disposition:  There is no disposition on file for this visit.  There is no disposition time on file for this visit.    Follow-up:  [address] [place] [address] [place] [id number] [phone]  Follow up            Medications Prescribed:  Current Discharge Medication List        START taking these medications    Details   Ketorolac Tromethamine 10 MG Oral Tab Take 1 tablet (10 mg total) by mouth in the morning, at noon, and at bedtime for 5 days.  Qty: 15 tablet, Refills: 0      lidocaine (HM LIDOCAINE PATCH) 4 % External Patch Place 1 patch onto the skin daily.  Qty: 10 patch, Refills: 0             Supplementary Documentation:

## 2025-05-14 ENCOUNTER — PATIENT OUTREACH (OUTPATIENT)
Dept: CASE MANAGEMENT | Age: 50
End: 2025-05-14

## 2025-05-14 NOTE — PROGRESS NOTES
Patient had recent Emergency Room visit, calling to offer Primary Care Physician follow-up appointment (discharged 05/10)    [facility]  [address] [place] [address]  [phone]    Attempt #1:  Left message on voicemail for patient to call transitions specialist back to schedule follow up appointments. Provided Transitions specialist scheduling phone number [phone].

## 2025-05-15 NOTE — PROGRESS NOTES
Patient had recent Emergency Room visit, calling to offer Primary Care Physician follow-up appointment (discharged 05/10)     Dr Lesly Holguin  [facility]  [address] [place] [id number] [phone]  Patient declined appointment; patient advised he's doing ok  Closing encounter

## 2025-06-25 PROCEDURE — 88305 TISSUE EXAM BY PATHOLOGIST: CPT

## 2025-06-26 ENCOUNTER — LAB REQUISITION (OUTPATIENT)
Dept: LAB | Facility: HOSPITAL | Age: 50
End: 2025-06-26
Payer: COMMERCIAL

## 2025-06-26 DIAGNOSIS — D48.2 NEOPLASM OF UNCERTAIN BEHAVIOR OF PERIPHERAL NERVES AND AUTONOMIC NERVOUS SYSTEM: ICD-10-CM

## 2025-07-15 ENCOUNTER — MED REC SCAN ONLY (OUTPATIENT)
Dept: FAMILY MEDICINE CLINIC | Facility: CLINIC | Age: 50
End: 2025-07-15

## 2025-07-28 ENCOUNTER — HOSPITAL ENCOUNTER (OUTPATIENT)
Age: 50
Discharge: HOME | End: 2025-07-28
Attending: EMERGENCY MEDICINE

## 2025-07-28 VITALS
BODY MASS INDEX: 32.33 KG/M2 | HEIGHT: 67 IN | OXYGEN SATURATION: 97 % | TEMPERATURE: 98 F | DIASTOLIC BLOOD PRESSURE: 83 MMHG | WEIGHT: 206 LBS | SYSTOLIC BLOOD PRESSURE: 133 MMHG | RESPIRATION RATE: 20 BRPM | HEART RATE: 81 BPM

## 2025-07-28 DIAGNOSIS — M54.9 BACK PAIN WITHOUT RADIATION: Primary | ICD-10-CM

## 2025-07-28 PROCEDURE — 99214 OFFICE O/P EST MOD 30 MIN: CPT

## 2025-07-28 PROCEDURE — 96372 THER/PROPH/DIAG INJ SC/IM: CPT

## 2025-07-28 RX ORDER — KETOROLAC TROMETHAMINE 30 MG/ML
15 INJECTION, SOLUTION INTRAMUSCULAR; INTRAVENOUS ONCE
Status: COMPLETED | OUTPATIENT
Start: 2025-07-28 | End: 2025-07-28

## 2025-08-27 ENCOUNTER — TELEPHONE (OUTPATIENT)
Dept: FAMILY MEDICINE CLINIC | Facility: CLINIC | Age: 50
End: 2025-08-27

## 2025-08-27 ENCOUNTER — OFFICE VISIT (OUTPATIENT)
Dept: FAMILY MEDICINE CLINIC | Facility: CLINIC | Age: 50
End: 2025-08-27

## 2025-08-27 VITALS
DIASTOLIC BLOOD PRESSURE: 72 MMHG | BODY MASS INDEX: 33.12 KG/M2 | WEIGHT: 211 LBS | HEIGHT: 67 IN | SYSTOLIC BLOOD PRESSURE: 120 MMHG | OXYGEN SATURATION: 98 % | HEART RATE: 60 BPM

## 2025-08-27 DIAGNOSIS — M54.50 LUMBAR PAIN: Primary | ICD-10-CM

## 2025-08-27 DIAGNOSIS — E66.811 OBESITY (BMI 30.0-34.9): ICD-10-CM

## 2025-08-27 PROCEDURE — 3074F SYST BP LT 130 MM HG: CPT | Performed by: FAMILY MEDICINE

## 2025-08-27 PROCEDURE — 3078F DIAST BP <80 MM HG: CPT | Performed by: FAMILY MEDICINE

## 2025-08-27 PROCEDURE — 99214 OFFICE O/P EST MOD 30 MIN: CPT | Performed by: FAMILY MEDICINE

## 2025-08-27 PROCEDURE — 3008F BODY MASS INDEX DOCD: CPT | Performed by: FAMILY MEDICINE

## 2025-08-27 RX ORDER — PHENTERMINE HYDROCHLORIDE 37.5 MG/1
37.5 TABLET ORAL
Qty: 30 TABLET | Refills: 5 | Status: SHIPPED | OUTPATIENT
Start: 2025-08-27

## 2025-08-27 RX ORDER — CYCLOBENZAPRINE HCL 10 MG
10 TABLET ORAL NIGHTLY
Qty: 30 TABLET | Refills: 5 | Status: SHIPPED | OUTPATIENT
Start: 2025-08-27

## 2025-08-27 RX ORDER — METHYLPREDNISOLONE 4 MG/1
TABLET ORAL
Qty: 1 EACH | Refills: 1 | Status: SHIPPED | OUTPATIENT
Start: 2025-08-27

## (undated) NOTE — LETTER
Date: 5/15/2021    Patient Name: Miko Wing          To Whom it may concern: This letter has been written at the patient's request. The above patient was seen at the [facility] for treatment of a medical condition.     This patient sh

## (undated) NOTE — LETTER
Date: 12/7/2019    Patient Name: Miko Wing          To Whom it may concern: The above patient was seen at the [facility] the [facility] for treatment of a medical condition.     This patient should be excused from attending work from 12/7/19 ekta

## (undated) NOTE — LETTER
Date: 12/6/2019    Patient Name: Tino Logan          To Whom it may concern: The above patient was seen at the [facility] for treatment of a medical condition.     This patient should be excused from attending work from 12/6/19 thr

## (undated) NOTE — LETTER
Date & Time: 10/25/2024, 12:36 PM  Patient: Jayro Franks  Encounter Provider(s):    Pastora Correa MD       To Whom It May Concern:    Jayro Franks was seen and treated in our department on 10/25/2024. He should not return to work until Derek 10/27 .    If you have any questions or concerns, please do not hesitate to call.        _____________________________  Physician/APC Signature

## (undated) NOTE — LETTER
Date: 10/8/2019    Patient Name: Ga Baer          To Whom it may concern: The above patient was seen at the [facility] for treatment of a medical condition. This patient should be excused from attending work/school 10/8/2019.

## (undated) NOTE — LETTER
Date: 11/8/2019    Patient Name: Fredy Young          To Whom it may concern: This letter has been written at the patient's request. The above patient was seen at the [facility] for treatment of a medical condition.     This patient sh

## (undated) NOTE — LETTER
06/06/18        Ron      Dear Elizabeth Potter,    [address] [facility] records indicate that you have outstanding lab work and or testing that was ordered for you and has not yet been completed:          LIPID PANEL [[id number]]      COMP

## (undated) NOTE — LETTER
04/11/18        Ron      Dear [facility],    [facility] records indicate that you have outstanding lab work and or testing that was ordered for you and has not yet been completed:          [facility]

## (undated) NOTE — LETTER
Date: 12/4/2018    Patient Name: Fidencio Jane          To Whom it may concern: The above patient was seen at the [facility] for treatment of a medical condition. This patient should be excused from attending work through 12/6/18.